# Patient Record
Sex: FEMALE | Race: WHITE | NOT HISPANIC OR LATINO | URBAN - METROPOLITAN AREA
[De-identification: names, ages, dates, MRNs, and addresses within clinical notes are randomized per-mention and may not be internally consistent; named-entity substitution may affect disease eponyms.]

---

## 2018-01-16 ENCOUNTER — EMERGENCY (EMERGENCY)
Facility: HOSPITAL | Age: 53
LOS: 1 days | Discharge: HOME | End: 2018-01-16
Attending: EMERGENCY MEDICINE | Admitting: EMERGENCY MEDICINE
Payer: COMMERCIAL

## 2018-01-16 VITALS
RESPIRATION RATE: 16 BRPM | DIASTOLIC BLOOD PRESSURE: 72 MMHG | HEART RATE: 85 BPM | SYSTOLIC BLOOD PRESSURE: 104 MMHG | OXYGEN SATURATION: 100 %

## 2018-01-16 VITALS
RESPIRATION RATE: 20 BRPM | HEART RATE: 123 BPM | SYSTOLIC BLOOD PRESSURE: 108 MMHG | TEMPERATURE: 97 F | DIASTOLIC BLOOD PRESSURE: 73 MMHG | OXYGEN SATURATION: 96 %

## 2018-01-16 DIAGNOSIS — Z98.89 OTHER SPECIFIED POSTPROCEDURAL STATES: Chronic | ICD-10-CM

## 2018-01-16 LAB
ALBUMIN SERPL ELPH-MCNC: 3.9 G/DL — SIGNIFICANT CHANGE UP (ref 3.3–5)
ALP SERPL-CCNC: 70 U/L — SIGNIFICANT CHANGE UP (ref 40–120)
ALT FLD-CCNC: 8 U/L RC — LOW (ref 10–45)
ANION GAP SERPL CALC-SCNC: 9 MMOL/L — SIGNIFICANT CHANGE UP (ref 5–17)
APPEARANCE UR: CLEAR — SIGNIFICANT CHANGE UP
AST SERPL-CCNC: 12 U/L — SIGNIFICANT CHANGE UP (ref 10–40)
BACTERIA # UR AUTO: ABNORMAL /HPF
BASOPHILS # BLD AUTO: 0.1 K/UL — SIGNIFICANT CHANGE UP (ref 0–0.2)
BASOPHILS NFR BLD AUTO: 0.7 % — SIGNIFICANT CHANGE UP (ref 0–2)
BILIRUB SERPL-MCNC: 0.3 MG/DL — SIGNIFICANT CHANGE UP (ref 0.2–1.2)
BILIRUB UR-MCNC: NEGATIVE — SIGNIFICANT CHANGE UP
BUN SERPL-MCNC: 14 MG/DL — SIGNIFICANT CHANGE UP (ref 7–23)
CALCIUM SERPL-MCNC: 9.2 MG/DL — SIGNIFICANT CHANGE UP (ref 8.4–10.5)
CHLORIDE SERPL-SCNC: 103 MMOL/L — SIGNIFICANT CHANGE UP (ref 96–108)
CO2 SERPL-SCNC: 29 MMOL/L — SIGNIFICANT CHANGE UP (ref 22–31)
COLOR SPEC: YELLOW — SIGNIFICANT CHANGE UP
CREAT SERPL-MCNC: 0.85 MG/DL — SIGNIFICANT CHANGE UP (ref 0.5–1.3)
DIFF PNL FLD: ABNORMAL
EOSINOPHIL # BLD AUTO: 0.2 K/UL — SIGNIFICANT CHANGE UP (ref 0–0.5)
EOSINOPHIL NFR BLD AUTO: 2.4 % — SIGNIFICANT CHANGE UP (ref 0–6)
EPI CELLS # UR: SIGNIFICANT CHANGE UP /HPF
GAS PNL BLDV: SIGNIFICANT CHANGE UP
GLUCOSE SERPL-MCNC: 106 MG/DL — HIGH (ref 70–99)
GLUCOSE UR QL: NEGATIVE — SIGNIFICANT CHANGE UP
HCT VFR BLD CALC: 38.2 % — SIGNIFICANT CHANGE UP (ref 34.5–45)
HGB BLD-MCNC: 13.1 G/DL — SIGNIFICANT CHANGE UP (ref 11.5–15.5)
KETONES UR-MCNC: NEGATIVE — SIGNIFICANT CHANGE UP
LEUKOCYTE ESTERASE UR-ACNC: ABNORMAL
LYMPHOCYTES # BLD AUTO: 1.6 K/UL — SIGNIFICANT CHANGE UP (ref 1–3.3)
LYMPHOCYTES # BLD AUTO: 22.2 % — SIGNIFICANT CHANGE UP (ref 13–44)
MCHC RBC-ENTMCNC: 33.3 PG — SIGNIFICANT CHANGE UP (ref 27–34)
MCHC RBC-ENTMCNC: 34.3 GM/DL — SIGNIFICANT CHANGE UP (ref 32–36)
MCV RBC AUTO: 97.1 FL — SIGNIFICANT CHANGE UP (ref 80–100)
MONOCYTES # BLD AUTO: 0.4 K/UL — SIGNIFICANT CHANGE UP (ref 0–0.9)
MONOCYTES NFR BLD AUTO: 5.4 % — SIGNIFICANT CHANGE UP (ref 2–14)
NEUTROPHILS # BLD AUTO: 5 K/UL — SIGNIFICANT CHANGE UP (ref 1.8–7.4)
NEUTROPHILS NFR BLD AUTO: 69.4 % — SIGNIFICANT CHANGE UP (ref 43–77)
NITRITE UR-MCNC: NEGATIVE — SIGNIFICANT CHANGE UP
PH UR: 6.5 — SIGNIFICANT CHANGE UP (ref 5–8)
PLATELET # BLD AUTO: 318 K/UL — SIGNIFICANT CHANGE UP (ref 150–400)
POTASSIUM SERPL-MCNC: 4 MMOL/L — SIGNIFICANT CHANGE UP (ref 3.5–5.3)
POTASSIUM SERPL-SCNC: 4 MMOL/L — SIGNIFICANT CHANGE UP (ref 3.5–5.3)
PROT SERPL-MCNC: 6.8 G/DL — SIGNIFICANT CHANGE UP (ref 6–8.3)
PROT UR-MCNC: NEGATIVE — SIGNIFICANT CHANGE UP
RBC # BLD: 3.93 M/UL — SIGNIFICANT CHANGE UP (ref 3.8–5.2)
RBC # FLD: 11.6 % — SIGNIFICANT CHANGE UP (ref 10.3–14.5)
RBC CASTS # UR COMP ASSIST: ABNORMAL /HPF (ref 0–2)
SODIUM SERPL-SCNC: 141 MMOL/L — SIGNIFICANT CHANGE UP (ref 135–145)
SP GR SPEC: 1.01 — SIGNIFICANT CHANGE UP (ref 1.01–1.02)
UROBILINOGEN FLD QL: NEGATIVE — SIGNIFICANT CHANGE UP
WBC # BLD: 7.2 K/UL — SIGNIFICANT CHANGE UP (ref 3.8–10.5)
WBC # FLD AUTO: 7.2 K/UL — SIGNIFICANT CHANGE UP (ref 3.8–10.5)
WBC UR QL: SIGNIFICANT CHANGE UP /HPF (ref 0–5)

## 2018-01-16 PROCEDURE — 85027 COMPLETE CBC AUTOMATED: CPT

## 2018-01-16 PROCEDURE — 74176 CT ABD & PELVIS W/O CONTRAST: CPT | Mod: 26

## 2018-01-16 PROCEDURE — 81001 URINALYSIS AUTO W/SCOPE: CPT

## 2018-01-16 PROCEDURE — 99284 EMERGENCY DEPT VISIT MOD MDM: CPT | Mod: 25

## 2018-01-16 PROCEDURE — 82435 ASSAY OF BLOOD CHLORIDE: CPT

## 2018-01-16 PROCEDURE — 82330 ASSAY OF CALCIUM: CPT

## 2018-01-16 PROCEDURE — 80053 COMPREHEN METABOLIC PANEL: CPT

## 2018-01-16 PROCEDURE — 82803 BLOOD GASES ANY COMBINATION: CPT

## 2018-01-16 PROCEDURE — 84295 ASSAY OF SERUM SODIUM: CPT

## 2018-01-16 PROCEDURE — 96374 THER/PROPH/DIAG INJ IV PUSH: CPT

## 2018-01-16 PROCEDURE — 99285 EMERGENCY DEPT VISIT HI MDM: CPT | Mod: 25

## 2018-01-16 PROCEDURE — 84132 ASSAY OF SERUM POTASSIUM: CPT

## 2018-01-16 PROCEDURE — 87086 URINE CULTURE/COLONY COUNT: CPT

## 2018-01-16 PROCEDURE — 74176 CT ABD & PELVIS W/O CONTRAST: CPT

## 2018-01-16 PROCEDURE — 85014 HEMATOCRIT: CPT

## 2018-01-16 PROCEDURE — 83605 ASSAY OF LACTIC ACID: CPT

## 2018-01-16 PROCEDURE — 82947 ASSAY GLUCOSE BLOOD QUANT: CPT

## 2018-01-16 RX ORDER — KETOROLAC TROMETHAMINE 30 MG/ML
15 SYRINGE (ML) INJECTION ONCE
Qty: 0 | Refills: 0 | Status: DISCONTINUED | OUTPATIENT
Start: 2018-01-16 | End: 2018-01-16

## 2018-01-16 RX ORDER — MORPHINE SULFATE 50 MG/1
4 CAPSULE, EXTENDED RELEASE ORAL ONCE
Qty: 0 | Refills: 0 | Status: DISCONTINUED | OUTPATIENT
Start: 2018-01-16 | End: 2018-01-16

## 2018-01-16 RX ORDER — SODIUM CHLORIDE 9 MG/ML
1000 INJECTION INTRAMUSCULAR; INTRAVENOUS; SUBCUTANEOUS ONCE
Qty: 0 | Refills: 0 | Status: COMPLETED | OUTPATIENT
Start: 2018-01-16 | End: 2018-01-16

## 2018-01-16 RX ADMIN — SODIUM CHLORIDE 1000 MILLILITER(S): 9 INJECTION INTRAMUSCULAR; INTRAVENOUS; SUBCUTANEOUS at 08:45

## 2018-01-16 RX ADMIN — Medication 15 MILLIGRAM(S): at 09:15

## 2018-01-16 RX ADMIN — Medication 15 MILLIGRAM(S): at 08:45

## 2018-01-16 NOTE — ED ADULT NURSE NOTE - OBJECTIVE STATEMENT
53y/o female with history of kidney stone, walked into ED a&ox3 c/o flank pain. Patient reports waking up suddenly at around 0300 with pain to her left side. Described as sharp pain to left lower abdomen radiating to left flank, 10/10. Denies N/V/D, urinary symptoms, fever, chills, dizziness, SOB, CP. Lungs clear b/l. Abd soft, nontender, nondistended. +CVA tenderness on left. Awaiting MD sauer. Safety and comfort maintained.

## 2018-01-16 NOTE — ED PROVIDER NOTE - MEDICAL DECISION MAKING DETAILS
Ap Martinez, PGY2: 52F kidney stone in past p/w left flank/LLQ pain and TTP, left CVAT and dark urine today. Concern for kidney stone vs intraabdominal pathology. CT, labs, pain control, UA and culture. Ap Martinez, PGY2: 52F kidney stone in past p/w left flank/LLQ pain and TTP, left CVAT and dark urine today. Concern for kidney stone vs intraabdominal pathology. CT, labs, pain control, UA and culture.  ATTG: left side back / cva tend and abd tend. concern for stone, ddx includes but not limited to colitis, diverticulitis, less likely obstruction / gyn path, will check labs, check ct a/p, ivf, pain medication, re eval for dispo

## 2018-01-16 NOTE — ED PROVIDER NOTE - SKIN, MLM
Skin normal color for race, warm, dry and intact. Blanching erythematous marks on left abdomen where pt is grasping herself.

## 2018-01-16 NOTE — ED PROVIDER NOTE - ATTENDING CONTRIBUTION TO CARE
53 y/o f with pmhx Endometriosis, hx salpingo/oophorectomy, s/p ventral hernia s/p repair presents for left side flank pain that began this morning at around 4 am and woke her up. no vomiting no nausea. no diarrhea. no abd pain. also noted darker colored urine.  last stone 10 yrs ago.  no urinary complaints,. pain is dull and constant, radiates ant. no associated diarrhea. no chest pain no heart palp. did not take any meds today. denies alcohol / tob / illicit  PMD Dr. Guthrie.   Gen.  no acute distress, mild discomfort.   HEENT: PERRL   Lungs:  b/l BS  CVS: S1S2   Abd;  soft, mild llq tend to palp.   Ext: no edema.  Neuro:aaox3.   MSK: 5/5 x4 muscle strength + cva tend on left side.

## 2018-01-16 NOTE — ED PROVIDER NOTE - OBJECTIVE STATEMENT
53yo female pmh endometriosis, ventral hernia s/p repair p/w left flank/LLQ pain that woke pt from sleep at 4am. "Feels like kidney stone I had over 10 years ago." Sharp, 10/10 intermittent pain with radiation to the back. Dark urine this morning. Multiple abd surgeries in past for endometriosis. LMP years ago (endometrial ablation). Last BM 2d ago, normal for her. Unsure if passing gas. Denies fever, chills, chest pain, dyspnea, palpitations, dizziness, weakness, recent cough, nausea, vomiting, diarrhea, bladder and bowel problems, leg swelling, sick contact, recent long travel.    Significant past medical hx/surgical hx/social hx and review of systems can be found above in the history of present illness.

## 2018-01-16 NOTE — ED ADULT NURSE NOTE - PSH
H/O umbilical hernia repair    History of endometrial ablation  7/2008  History of laparoscopy  x 7 for endometriosis 3/93,6/93,12/97,5/00,3/02,3/04, 8/11

## 2018-01-16 NOTE — ED PROVIDER NOTE - PROGRESS NOTE DETAILS
ATTG: Dr. Betts - patient feels better. pain improved. awaiting results of Urinalysis for final dispo, r/o contaminant infection with stone. Patient informed of ED visit findings, understands plan.  Patient provided with written and further verbal instructions not included in discharge paperwork.  Patient instructed to follow up with their primary care physician in 2-3 days and return for new, worsened, or persistent symptoms. Discussed pending urine culture and follow up. Discussed fluid intake and ibuprofen dosing and urology follow up. Pt states she feels well

## 2018-01-17 LAB
CULTURE RESULTS: NO GROWTH — SIGNIFICANT CHANGE UP
SPECIMEN SOURCE: SIGNIFICANT CHANGE UP

## 2018-08-15 ENCOUNTER — EMERGENCY (EMERGENCY)
Facility: HOSPITAL | Age: 53
LOS: 1 days | Discharge: HOME | End: 2018-08-15
Attending: EMERGENCY MEDICINE
Payer: COMMERCIAL

## 2018-08-15 VITALS
SYSTOLIC BLOOD PRESSURE: 115 MMHG | HEART RATE: 87 BPM | RESPIRATION RATE: 17 BRPM | OXYGEN SATURATION: 100 % | TEMPERATURE: 98 F | DIASTOLIC BLOOD PRESSURE: 86 MMHG

## 2018-08-15 DIAGNOSIS — Z98.89 OTHER SPECIFIED POSTPROCEDURAL STATES: Chronic | ICD-10-CM

## 2018-08-15 PROCEDURE — 99284 EMERGENCY DEPT VISIT MOD MDM: CPT

## 2018-08-15 PROCEDURE — 70450 CT HEAD/BRAIN W/O DYE: CPT

## 2018-08-15 PROCEDURE — 73562 X-RAY EXAM OF KNEE 3: CPT | Mod: 26,50

## 2018-08-15 PROCEDURE — 73562 X-RAY EXAM OF KNEE 3: CPT

## 2018-08-15 PROCEDURE — 73060 X-RAY EXAM OF HUMERUS: CPT | Mod: 26,RT

## 2018-08-15 PROCEDURE — 70450 CT HEAD/BRAIN W/O DYE: CPT | Mod: 26

## 2018-08-15 PROCEDURE — 70486 CT MAXILLOFACIAL W/O DYE: CPT | Mod: 26

## 2018-08-15 PROCEDURE — 72125 CT NECK SPINE W/O DYE: CPT | Mod: 26

## 2018-08-15 PROCEDURE — 72125 CT NECK SPINE W/O DYE: CPT

## 2018-08-15 PROCEDURE — 99285 EMERGENCY DEPT VISIT HI MDM: CPT | Mod: 25

## 2018-08-15 PROCEDURE — 73060 X-RAY EXAM OF HUMERUS: CPT

## 2018-08-15 PROCEDURE — 73030 X-RAY EXAM OF SHOULDER: CPT

## 2018-08-15 PROCEDURE — 90471 IMMUNIZATION ADMIN: CPT

## 2018-08-15 PROCEDURE — 70486 CT MAXILLOFACIAL W/O DYE: CPT

## 2018-08-15 PROCEDURE — 90715 TDAP VACCINE 7 YRS/> IM: CPT

## 2018-08-15 PROCEDURE — 73030 X-RAY EXAM OF SHOULDER: CPT | Mod: 26,50

## 2018-08-15 RX ORDER — ACETAMINOPHEN 500 MG
975 TABLET ORAL ONCE
Qty: 0 | Refills: 0 | Status: COMPLETED | OUTPATIENT
Start: 2018-08-15 | End: 2018-08-15

## 2018-08-15 RX ORDER — TETANUS TOXOID, REDUCED DIPHTHERIA TOXOID AND ACELLULAR PERTUSSIS VACCINE, ADSORBED 5; 2.5; 8; 8; 2.5 [IU]/.5ML; [IU]/.5ML; UG/.5ML; UG/.5ML; UG/.5ML
0.5 SUSPENSION INTRAMUSCULAR ONCE
Qty: 0 | Refills: 0 | Status: COMPLETED | OUTPATIENT
Start: 2018-08-15 | End: 2018-08-15

## 2018-08-15 RX ORDER — IBUPROFEN 200 MG
600 TABLET ORAL ONCE
Qty: 0 | Refills: 0 | Status: COMPLETED | OUTPATIENT
Start: 2018-08-15 | End: 2018-08-15

## 2018-08-15 RX ADMIN — Medication 600 MILLIGRAM(S): at 13:50

## 2018-08-15 RX ADMIN — Medication 975 MILLIGRAM(S): at 09:24

## 2018-08-15 RX ADMIN — TETANUS TOXOID, REDUCED DIPHTHERIA TOXOID AND ACELLULAR PERTUSSIS VACCINE, ADSORBED 0.5 MILLILITER(S): 5; 2.5; 8; 8; 2.5 SUSPENSION INTRAMUSCULAR at 09:25

## 2018-08-15 NOTE — ED PROVIDER NOTE - ATTENDING CONTRIBUTION TO CARE
51 y/o female with the above documented history and HPI who on exam appears well but a bit uncomfortable. VSs noted, head NC/AT s/ preauricular ecchymosis or otorrhea, face c/ R temporal abrasion, slight swelling and ttp and R nasal ridge abrasion s/ periorbital ecchymosis, rhinorrhea, epistaxis, midface instability, jaw malalignment or loose dentition, EOMsI, neck supple c/ non-focal midline c-spine ttp s/ palpable deformity, lungs CTA, chest wall s/ ecchymosis, flail or ttp, cardiac sounds s/ audible m/r/g, abdomen soft, NT/ND s/ ecchymosis, back s/ ecchymosis or midline ttp, extremities c/ reddening to lateral aspect of R shoulder/proximal R humerus c/ ttp and as a result not put through ROM testing c/ otherwise a FROM at the R elbow, wrist and all digits of the R hand as well as all other extremities and joints c/ some discomfort on ROM testing of L shoulder and abrasions to B/L knees c/ ttp approximating B/L patellae s/ palpable bony deformity, distal femur or proximal tib/fib ttp or joint laxity, skin s/ laceration and neurologically completely intact. Appropriate screening studies obtained. Td updated. Analgesia provided. Will follow her studies, reassess and treat/dispo accordingly.

## 2018-08-15 NOTE — ED PROVIDER NOTE - OBJECTIVE STATEMENT
Ted Thompson MD: 52 F who was BIBEMS for witnessed slip and fall, toppling down approximately 8 steps, landing on the R side, including the R shoulder and R head and upper face. Reports pain of the R>L shoulder, bilateral knees. Abrasions over the R face and the bilateral lower legs. Pt reports no syncope, lightheadedness, vomiting, weakness, numbness, prodromal symptoms. No LOC, no instability of the face. NO blood thinners or antiplatelets

## 2018-08-15 NOTE — ED PROVIDER NOTE - PROGRESS NOTE DETAILS
Spoke with Radiology regarding plain films. Have not yet had opportunity to review images. The patient does not want to wait any longer. We will DC as requested. Provided copies of results. Called patient and informed her of XR results.

## 2018-08-15 NOTE — ED ADULT NURSE NOTE - NSIMPLEMENTINTERV_GEN_ALL_ED
Implemented All Fall Risk Interventions:  [place] to call system. Call bell, personal items and telephone within reach. Instruct patient to call for assistance. Room bathroom lighting operational. Non-slip footwear when patient is off stretcher. Physically safe environment: no spills, clutter or unnecessary equipment. Stretcher in lowest position, wheels locked, appropriate side rails in place. Provide visual cue, wrist band, yellow gown, etc. Monitor gait and stability. Monitor for mental status changes and reorient to person, place, and time. Review medications for side effects contributing to fall risk. Reinforce activity limits and safety measures with patient and family.

## 2018-08-15 NOTE — ED PROVIDER NOTE - MEDICAL DECISION MAKING DETAILS
Ted Thompson MD: mechanical fall down approx 8 steps, no prodrome or syncope. Injury predominately to the R side. Due to mechanism will get CT scan of head. TTP to C-spine midline, so will CT C-spine. Diff w/ full ROM of the R shoulder and B/L patella, so will get XR.

## 2018-08-15 NOTE — ED PROVIDER NOTE - PHYSICAL EXAMINATION
Primary Survey: airway intact, breathing with symmetrical bilateral lung sounds, circulation with pulses in all 4 extremities.  Secondary Survey: adult F who is AAOx3, normal speech, cooperative, no TTP or deformity to cranium,  GCS 15 , PERRL, 4 mm in diameter, TM without hemotypanum, no bruising to periorbital or post-auricular area, EOMI without diplopia or discomfort, trachea midline, C-collar in place, no stridor, stable max-face, CTAB, NRRR. No chest wall tenderness, deformity or crepitus. No abdominal tenderness or guarding. No signs of external trauma to chest and abdomen. No tenderness or deformity to cervical/thoracic/lumbar vertebrae, hips. Pelvis stable. Extremities neurovascularly intact with soft compartments. No focal sensory or motor deficits. Skin with punctate laceration to the R eyebrow and R nose, abrasions to the bilateral knees and bilateral shins. + TTP to the R shoulder and B/L patella.

## 2018-08-15 NOTE — ED PROVIDER NOTE - NS ED ROS FT
CONST: no fevers  EYES:  no vision changes   HENT: no hearing changes, no epistaxis  CV: no chest pain, no palpitations     RESP: no shortness of breath, no cough  ABD: no abdominal pain, no vomiting     : no hematuria  MSK: + R > L shoulder pain, BL knee pain     NEURO: + R headache, no focal weakness or loss of sensation     SKIN:  + abrasions.   ~ Ted Thompson MD

## 2018-08-15 NOTE — ED PROVIDER NOTE - CHIEF COMPLAINT
The patient is a 52y Female complaining of The patient is a 52y Female complaining of falling down steps.

## 2020-01-29 ENCOUNTER — EMERGENCY (EMERGENCY)
Facility: HOSPITAL | Age: 55
LOS: 1 days | End: 2020-01-29
Attending: EMERGENCY MEDICINE
Payer: COMMERCIAL

## 2020-01-29 VITALS
TEMPERATURE: 98 F | RESPIRATION RATE: 16 BRPM | OXYGEN SATURATION: 97 % | WEIGHT: 128.97 LBS | HEART RATE: 92 BPM | DIASTOLIC BLOOD PRESSURE: 79 MMHG | HEIGHT: 63 IN | SYSTOLIC BLOOD PRESSURE: 112 MMHG

## 2020-01-29 DIAGNOSIS — Z98.89 OTHER SPECIFIED POSTPROCEDURAL STATES: Chronic | ICD-10-CM

## 2020-01-29 LAB
ALBUMIN SERPL ELPH-MCNC: 4.7 G/DL — SIGNIFICANT CHANGE UP (ref 3.3–5)
ALP SERPL-CCNC: 111 U/L — SIGNIFICANT CHANGE UP (ref 40–120)
ALT FLD-CCNC: 16 U/L — SIGNIFICANT CHANGE UP (ref 10–45)
ANION GAP SERPL CALC-SCNC: 19 MMOL/L — HIGH (ref 5–17)
APPEARANCE UR: CLEAR — SIGNIFICANT CHANGE UP
APTT BLD: 29.5 SEC — SIGNIFICANT CHANGE UP (ref 27.5–36.3)
AST SERPL-CCNC: 20 U/L — SIGNIFICANT CHANGE UP (ref 10–40)
BACTERIA # UR AUTO: ABNORMAL
BASOPHILS # BLD AUTO: 0.05 K/UL — SIGNIFICANT CHANGE UP (ref 0–0.2)
BASOPHILS NFR BLD AUTO: 0.6 % — SIGNIFICANT CHANGE UP (ref 0–2)
BILIRUB SERPL-MCNC: 0.6 MG/DL — SIGNIFICANT CHANGE UP (ref 0.2–1.2)
BILIRUB UR-MCNC: ABNORMAL
BUN SERPL-MCNC: 23 MG/DL — SIGNIFICANT CHANGE UP (ref 7–23)
CALCIUM SERPL-MCNC: 9.9 MG/DL — SIGNIFICANT CHANGE UP (ref 8.4–10.5)
CHLORIDE SERPL-SCNC: 100 MMOL/L — SIGNIFICANT CHANGE UP (ref 96–108)
CO2 SERPL-SCNC: 21 MMOL/L — LOW (ref 22–31)
COLOR SPEC: YELLOW — SIGNIFICANT CHANGE UP
CREAT SERPL-MCNC: 0.85 MG/DL — SIGNIFICANT CHANGE UP (ref 0.5–1.3)
DIFF PNL FLD: ABNORMAL
EOSINOPHIL # BLD AUTO: 0.03 K/UL — SIGNIFICANT CHANGE UP (ref 0–0.5)
EOSINOPHIL NFR BLD AUTO: 0.4 % — SIGNIFICANT CHANGE UP (ref 0–6)
EPI CELLS # UR: 5 — SIGNIFICANT CHANGE UP
GAS PNL BLDV: SIGNIFICANT CHANGE UP
GLUCOSE SERPL-MCNC: 68 MG/DL — LOW (ref 70–99)
GLUCOSE UR QL: NEGATIVE — SIGNIFICANT CHANGE UP
HCT VFR BLD CALC: 44.9 % — SIGNIFICANT CHANGE UP (ref 34.5–45)
HGB BLD-MCNC: 14.1 G/DL — SIGNIFICANT CHANGE UP (ref 11.5–15.5)
HYALINE CASTS # UR AUTO: 0 /LPF — SIGNIFICANT CHANGE UP (ref 0–7)
IMM GRANULOCYTES NFR BLD AUTO: 0.1 % — SIGNIFICANT CHANGE UP (ref 0–1.5)
INR BLD: 1.04 RATIO — SIGNIFICANT CHANGE UP (ref 0.88–1.16)
KETONES UR-MCNC: ABNORMAL
LEUKOCYTE ESTERASE UR-ACNC: ABNORMAL
LIDOCAIN IGE QN: 29 U/L — SIGNIFICANT CHANGE UP (ref 7–60)
LYMPHOCYTES # BLD AUTO: 2.01 K/UL — SIGNIFICANT CHANGE UP (ref 1–3.3)
LYMPHOCYTES # BLD AUTO: 25.6 % — SIGNIFICANT CHANGE UP (ref 13–44)
MCHC RBC-ENTMCNC: 29.9 PG — SIGNIFICANT CHANGE UP (ref 27–34)
MCHC RBC-ENTMCNC: 31.4 GM/DL — LOW (ref 32–36)
MCV RBC AUTO: 95.1 FL — SIGNIFICANT CHANGE UP (ref 80–100)
MONOCYTES # BLD AUTO: 0.49 K/UL — SIGNIFICANT CHANGE UP (ref 0–0.9)
MONOCYTES NFR BLD AUTO: 6.3 % — SIGNIFICANT CHANGE UP (ref 2–14)
NEUTROPHILS # BLD AUTO: 5.25 K/UL — SIGNIFICANT CHANGE UP (ref 1.8–7.4)
NEUTROPHILS NFR BLD AUTO: 67 % — SIGNIFICANT CHANGE UP (ref 43–77)
NITRITE UR-MCNC: NEGATIVE — SIGNIFICANT CHANGE UP
NRBC # BLD: 0 /100 WBCS — SIGNIFICANT CHANGE UP (ref 0–0)
PH UR: 5.5 — SIGNIFICANT CHANGE UP (ref 5–8)
PLATELET # BLD AUTO: 386 K/UL — SIGNIFICANT CHANGE UP (ref 150–400)
POTASSIUM SERPL-MCNC: 3.9 MMOL/L — SIGNIFICANT CHANGE UP (ref 3.5–5.3)
POTASSIUM SERPL-SCNC: 3.9 MMOL/L — SIGNIFICANT CHANGE UP (ref 3.5–5.3)
PROT SERPL-MCNC: 8 G/DL — SIGNIFICANT CHANGE UP (ref 6–8.3)
PROT UR-MCNC: ABNORMAL
PROTHROM AB SERPL-ACNC: 11.9 SEC — SIGNIFICANT CHANGE UP (ref 10–12.9)
RBC # BLD: 4.72 M/UL — SIGNIFICANT CHANGE UP (ref 3.8–5.2)
RBC # FLD: 12.1 % — SIGNIFICANT CHANGE UP (ref 10.3–14.5)
RBC CASTS # UR COMP ASSIST: 6 /HPF — HIGH (ref 0–4)
SODIUM SERPL-SCNC: 140 MMOL/L — SIGNIFICANT CHANGE UP (ref 135–145)
SP GR SPEC: 1.03 — HIGH (ref 1.01–1.02)
UROBILINOGEN FLD QL: NEGATIVE — SIGNIFICANT CHANGE UP
WBC # BLD: 7.84 K/UL — SIGNIFICANT CHANGE UP (ref 3.8–10.5)
WBC # FLD AUTO: 7.84 K/UL — SIGNIFICANT CHANGE UP (ref 3.8–10.5)
WBC UR QL: 15 /HPF — HIGH (ref 0–5)

## 2020-01-29 PROCEDURE — 74177 CT ABD & PELVIS W/CONTRAST: CPT | Mod: 26

## 2020-01-29 PROCEDURE — 99218: CPT

## 2020-01-29 RX ORDER — CEFTRIAXONE 500 MG/1
1000 INJECTION, POWDER, FOR SOLUTION INTRAMUSCULAR; INTRAVENOUS EVERY 24 HOURS
Refills: 0 | Status: DISCONTINUED | OUTPATIENT
Start: 2020-01-29 | End: 2020-02-02

## 2020-01-29 RX ORDER — METOCLOPRAMIDE HCL 10 MG
10 TABLET ORAL EVERY 8 HOURS
Refills: 0 | Status: DISCONTINUED | OUTPATIENT
Start: 2020-01-29 | End: 2020-02-02

## 2020-01-29 RX ORDER — BUPROPION HYDROCHLORIDE 150 MG/1
150 TABLET, EXTENDED RELEASE ORAL ONCE
Refills: 0 | Status: COMPLETED | OUTPATIENT
Start: 2020-01-29 | End: 2020-01-30

## 2020-01-29 RX ORDER — ONDANSETRON 8 MG/1
4 TABLET, FILM COATED ORAL ONCE
Refills: 0 | Status: COMPLETED | OUTPATIENT
Start: 2020-01-29 | End: 2020-01-29

## 2020-01-29 RX ORDER — SODIUM CHLORIDE 9 MG/ML
1000 INJECTION, SOLUTION INTRAVENOUS ONCE
Refills: 0 | Status: COMPLETED | OUTPATIENT
Start: 2020-01-29 | End: 2020-01-29

## 2020-01-29 RX ORDER — OXYCODONE HYDROCHLORIDE 5 MG/1
5 TABLET ORAL EVERY 6 HOURS
Refills: 0 | Status: DISCONTINUED | OUTPATIENT
Start: 2020-01-29 | End: 2020-01-30

## 2020-01-29 RX ORDER — SODIUM CHLORIDE 9 MG/ML
3 INJECTION INTRAMUSCULAR; INTRAVENOUS; SUBCUTANEOUS EVERY 8 HOURS
Refills: 0 | Status: DISCONTINUED | OUTPATIENT
Start: 2020-01-29 | End: 2020-02-02

## 2020-01-29 RX ORDER — KETOROLAC TROMETHAMINE 30 MG/ML
15 SYRINGE (ML) INJECTION EVERY 6 HOURS
Refills: 0 | Status: DISCONTINUED | OUTPATIENT
Start: 2020-01-29 | End: 2020-01-29

## 2020-01-29 RX ORDER — CEFTRIAXONE 500 MG/1
1000 INJECTION, POWDER, FOR SOLUTION INTRAMUSCULAR; INTRAVENOUS ONCE
Refills: 0 | Status: COMPLETED | OUTPATIENT
Start: 2020-01-29 | End: 2020-01-29

## 2020-01-29 RX ORDER — ACETAMINOPHEN 500 MG
975 TABLET ORAL EVERY 6 HOURS
Refills: 0 | Status: DISCONTINUED | OUTPATIENT
Start: 2020-01-29 | End: 2020-02-02

## 2020-01-29 RX ORDER — ONDANSETRON 8 MG/1
4 TABLET, FILM COATED ORAL EVERY 6 HOURS
Refills: 0 | Status: DISCONTINUED | OUTPATIENT
Start: 2020-01-29 | End: 2020-02-02

## 2020-01-29 RX ORDER — SODIUM CHLORIDE 9 MG/ML
1000 INJECTION INTRAMUSCULAR; INTRAVENOUS; SUBCUTANEOUS
Refills: 0 | Status: DISCONTINUED | OUTPATIENT
Start: 2020-01-29 | End: 2020-02-02

## 2020-01-29 RX ADMIN — Medication 10 MILLIGRAM(S): at 19:03

## 2020-01-29 RX ADMIN — ONDANSETRON 4 MILLIGRAM(S): 8 TABLET, FILM COATED ORAL at 16:00

## 2020-01-29 RX ADMIN — SODIUM CHLORIDE 3 MILLILITER(S): 9 INJECTION INTRAMUSCULAR; INTRAVENOUS; SUBCUTANEOUS at 22:00

## 2020-01-29 RX ADMIN — SODIUM CHLORIDE 2000 MILLILITER(S): 9 INJECTION, SOLUTION INTRAVENOUS at 13:19

## 2020-01-29 RX ADMIN — SODIUM CHLORIDE 150 MILLILITER(S): 9 INJECTION INTRAMUSCULAR; INTRAVENOUS; SUBCUTANEOUS at 18:46

## 2020-01-29 RX ADMIN — CEFTRIAXONE 100 MILLIGRAM(S): 500 INJECTION, POWDER, FOR SOLUTION INTRAMUSCULAR; INTRAVENOUS at 16:36

## 2020-01-29 RX ADMIN — ONDANSETRON 4 MILLIGRAM(S): 8 TABLET, FILM COATED ORAL at 13:19

## 2020-01-29 NOTE — ED ADULT NURSE NOTE - OBJECTIVE STATEMENT
54y female with hx of laparoscopic sx, endometriosis c/o abdominal pain. pt is alert and oriented x 4 and speaking coherently. pt states that she went to her pcp today due to persistent nausea and vomiting. pt states that she was sent in for r/o obstruction. pt states she cannot remember the last time she had a BM, and is not passing gas. Pt states she has generalized abd pain, states "it feels sore". pain is worse with palpation. Pt denies cp, sob, diarrhea, fevers. pt c/o intermittent chills. vss. pt in nad. spouse at the bedside. will reassess.

## 2020-01-29 NOTE — ED PROVIDER NOTE - CPE EDP CARDIAC NORM
Injury at work. Pt states he felt piece of steel land in his left eye. Pt feels it is still in there. No redness or tearing to left eye. normal...

## 2020-01-29 NOTE — ED CDU PROVIDER INITIAL DAY NOTE - OBJECTIVE STATEMENT
55 yo female PMhx migraines, nephrolithiasis, endometriosis s/p multiple laparoscopic surgeries presents to the ED c/o nausea and vomiting x 5 days. Sxs first started w/ nausea, next day began having multiple episodes of vomiting any time she would eat or drink. Symptoms have persisted with continued vomiting any time she trials PO. Does not think she has had BM in ~3 days. Unsure if she's passing flatus. Endorsing mild generalized abdominal soreness when she touches, none at rest. Went to PMD today and was sent to ED to r/o obstruction. Denies diarrhea, fever/chills, numbness/tingling, recent travel, recent sick contacts, cp, sob, back pain, urinary urgency, frequency, dysuria, hematuria, hemoptysis.    ED course: CBC, CMP negative, UA +ketones bili leuks blood, CT AP showed nonobstructive renal calculi. Given IVF, zofran x2, and ceftriaxone 1g for pyelo. Patient transferred to the CDU for IVF, anti-emetics, and IV ceftriaxone

## 2020-01-29 NOTE — ED CDU PROVIDER INITIAL DAY NOTE - DETAILS
53 yo female PMhx migraines, nephrolithiasis, endometriosis s/p multiple laparoscopic surgeries presents to the ED c/o nausea and vomiting x 5 days.   *PYELONEPHRITIS   -IV ceftriaxone   -IVF  -antiemetics  -vital signs abd viral butler-nick  -Discussed case with Dr. Brower

## 2020-01-29 NOTE — ED PROVIDER NOTE - PROGRESS NOTE DETAILS
Pt still continues to be nauseous. CT negative fro acute pathology, +small stone still in kidney. UA+ for infection, d/w attending will tx for pyelo. Offered pt CDU for continued IVF, abx and antiemetics prn, pt agreeable. CDU aware and accepted. - James Miller PA-C

## 2020-01-29 NOTE — ED PROVIDER NOTE - ATTENDING CONTRIBUTION TO CARE
54F, pmh migraine headaches, nephrolithiasis, endometriosis, s/p numerous abd surgeries for endometriosis, presents with nasuea, vomiting, abd pain x 5 days. pt with nbnb vomiting, inability to tolerate PO. +Generalized abd apin, mild, waxign and waning, worse with palpation. Pt without bm x a few days. Denies diarrhea, melena, hematochezia. Denies f/c, cough, congestion, cp, sob.    PE: NAD, NCAT, MMM, Trachea midline, Normal conjunctiva, lungs CTAB, S1/S2 RRR, Normal perfusion, 2+ radial pulses bilat, Abdomen Soft, mild diffuse ttp, No rebound/guarding, No LE edema, No deformity of extremities, No rashes,  No focal motor or sensory deficits.     Ddx includes but not limtied to sbo, diverticulitis, gastritis, pancreatitis, colitis. Obtain labs, ct a/p, give ivf, antiemetics and analgesia as needed, re-eval. - Renaldo Walden MD

## 2020-01-29 NOTE — ED PROVIDER NOTE - OBJECTIVE STATEMENT
53 yo female PMhx migraines, nephrolithiasis, endometriosis s/p multiple laparoscopic surgeries presents to the ED c/o nausea and vomiting x 5 days. Sxs first started w/ nausea, next day began having multiple episodes of vomiting any time she would eat or drink. Symptoms have persisted with continued vomiting any time she trials PO. Does not think she has had BM in ~3 days. Unsure if she's passing flatus. Endorsing mild generalized abdominal soreness when she touches, none at rest. Went to PMD today and was sent to ED to r/o obstruction. Denies diarrhea, fever/chills, numbness/tingling, recent travel, recent sick contacts, cp, sob, back pain, urinary urgency, frequency, dysuria, hematuria, hemoptysis.

## 2020-01-29 NOTE — ED CDU PROVIDER INITIAL DAY NOTE - PROGRESS NOTE DETAILS
CDU NOTE HALLIE DAVIS: Pt resting comfortably, feeling well without complaint. no abd/flank pain, no N/V. NAD, VSS. Abd: flat/ND/NT/soft, no CVAT b/l. will continue monitoring.

## 2020-01-29 NOTE — ED ADULT NURSE REASSESSMENT NOTE - NS ED NURSE REASSESS COMMENT FT1
18.45 Received Pt from DARNELL Funk . Pt is observed for pyelonephritis. Pt is oriented to the Unit  Comfort care safety measures initiated  Pt C/O nausea denies  fever chills Cp Sob  Continue to monitor
Received report from Jaida @ [id number], Pt sitting comfortable at present, no acute distress
Pt received from DARNELL Giles. Pt oriented to CDU & plan of care was discussed. Pt denies any pain at the moment. Pt denies any N/V and states after reglan she was able to tolerate crackers and clear liquids. Pt denies any urgency, frequency or burning with urination. Safety & comfort measures maintained. Call bell in reach. Will continue to monitor.

## 2020-01-30 VITALS
OXYGEN SATURATION: 96 % | TEMPERATURE: 98 F | DIASTOLIC BLOOD PRESSURE: 75 MMHG | HEART RATE: 66 BPM | SYSTOLIC BLOOD PRESSURE: 118 MMHG | RESPIRATION RATE: 16 BRPM

## 2020-01-30 LAB
BASOPHILS # BLD AUTO: 0.04 K/UL — SIGNIFICANT CHANGE UP (ref 0–0.2)
BASOPHILS NFR BLD AUTO: 0.8 % — SIGNIFICANT CHANGE UP (ref 0–2)
CULTURE RESULTS: SIGNIFICANT CHANGE UP
EOSINOPHIL # BLD AUTO: 0.08 K/UL — SIGNIFICANT CHANGE UP (ref 0–0.5)
EOSINOPHIL NFR BLD AUTO: 1.5 % — SIGNIFICANT CHANGE UP (ref 0–6)
GAS PNL BLDV: SIGNIFICANT CHANGE UP
HCT VFR BLD CALC: 34 % — LOW (ref 34.5–45)
HGB BLD-MCNC: 11.2 G/DL — LOW (ref 11.5–15.5)
IMM GRANULOCYTES NFR BLD AUTO: 0.2 % — SIGNIFICANT CHANGE UP (ref 0–1.5)
LYMPHOCYTES # BLD AUTO: 1.79 K/UL — SIGNIFICANT CHANGE UP (ref 1–3.3)
LYMPHOCYTES # BLD AUTO: 34 % — SIGNIFICANT CHANGE UP (ref 13–44)
MCHC RBC-ENTMCNC: 30.6 PG — SIGNIFICANT CHANGE UP (ref 27–34)
MCHC RBC-ENTMCNC: 32.9 GM/DL — SIGNIFICANT CHANGE UP (ref 32–36)
MCV RBC AUTO: 92.9 FL — SIGNIFICANT CHANGE UP (ref 80–100)
MONOCYTES # BLD AUTO: 0.44 K/UL — SIGNIFICANT CHANGE UP (ref 0–0.9)
MONOCYTES NFR BLD AUTO: 8.3 % — SIGNIFICANT CHANGE UP (ref 2–14)
NEUTROPHILS # BLD AUTO: 2.91 K/UL — SIGNIFICANT CHANGE UP (ref 1.8–7.4)
NEUTROPHILS NFR BLD AUTO: 55.2 % — SIGNIFICANT CHANGE UP (ref 43–77)
NRBC # BLD: 0 /100 WBCS — SIGNIFICANT CHANGE UP (ref 0–0)
PLATELET # BLD AUTO: 295 K/UL — SIGNIFICANT CHANGE UP (ref 150–400)
RBC # BLD: 3.66 M/UL — LOW (ref 3.8–5.2)
RBC # FLD: 12 % — SIGNIFICANT CHANGE UP (ref 10.3–14.5)
SPECIMEN SOURCE: SIGNIFICANT CHANGE UP
WBC # BLD: 5.27 K/UL — SIGNIFICANT CHANGE UP (ref 3.8–10.5)
WBC # FLD AUTO: 5.27 K/UL — SIGNIFICANT CHANGE UP (ref 3.8–10.5)

## 2020-01-30 PROCEDURE — 84132 ASSAY OF SERUM POTASSIUM: CPT

## 2020-01-30 PROCEDURE — 82565 ASSAY OF CREATININE: CPT

## 2020-01-30 PROCEDURE — 99284 EMERGENCY DEPT VISIT MOD MDM: CPT | Mod: 25

## 2020-01-30 PROCEDURE — 99217: CPT

## 2020-01-30 PROCEDURE — 84295 ASSAY OF SERUM SODIUM: CPT

## 2020-01-30 PROCEDURE — 87086 URINE CULTURE/COLONY COUNT: CPT

## 2020-01-30 PROCEDURE — 85027 COMPLETE CBC AUTOMATED: CPT

## 2020-01-30 PROCEDURE — 82435 ASSAY OF BLOOD CHLORIDE: CPT

## 2020-01-30 PROCEDURE — 85014 HEMATOCRIT: CPT

## 2020-01-30 PROCEDURE — G0378: CPT

## 2020-01-30 PROCEDURE — 81001 URINALYSIS AUTO W/SCOPE: CPT

## 2020-01-30 PROCEDURE — 96375 TX/PRO/DX INJ NEW DRUG ADDON: CPT

## 2020-01-30 PROCEDURE — 74177 CT ABD & PELVIS W/CONTRAST: CPT

## 2020-01-30 PROCEDURE — 83690 ASSAY OF LIPASE: CPT

## 2020-01-30 PROCEDURE — 82947 ASSAY GLUCOSE BLOOD QUANT: CPT

## 2020-01-30 PROCEDURE — 96374 THER/PROPH/DIAG INJ IV PUSH: CPT | Mod: XU

## 2020-01-30 PROCEDURE — 83605 ASSAY OF LACTIC ACID: CPT

## 2020-01-30 PROCEDURE — 85610 PROTHROMBIN TIME: CPT

## 2020-01-30 PROCEDURE — 82330 ASSAY OF CALCIUM: CPT

## 2020-01-30 PROCEDURE — 80053 COMPREHEN METABOLIC PANEL: CPT

## 2020-01-30 PROCEDURE — 85730 THROMBOPLASTIN TIME PARTIAL: CPT

## 2020-01-30 PROCEDURE — 82803 BLOOD GASES ANY COMBINATION: CPT

## 2020-01-30 RX ORDER — CEFPODOXIME PROXETIL 100 MG
1 TABLET ORAL
Qty: 20 | Refills: 0
Start: 2020-01-30 | End: 2020-02-08

## 2020-01-30 RX ADMIN — OXYCODONE HYDROCHLORIDE 5 MILLIGRAM(S): 5 TABLET ORAL at 13:10

## 2020-01-30 RX ADMIN — SODIUM CHLORIDE 3 MILLILITER(S): 9 INJECTION INTRAMUSCULAR; INTRAVENOUS; SUBCUTANEOUS at 06:06

## 2020-01-30 RX ADMIN — SODIUM CHLORIDE 150 MILLILITER(S): 9 INJECTION INTRAMUSCULAR; INTRAVENOUS; SUBCUTANEOUS at 01:38

## 2020-01-30 RX ADMIN — ONDANSETRON 4 MILLIGRAM(S): 8 TABLET, FILM COATED ORAL at 13:10

## 2020-01-30 RX ADMIN — BUPROPION HYDROCHLORIDE 150 MILLIGRAM(S): 150 TABLET, EXTENDED RELEASE ORAL at 13:10

## 2020-01-30 NOTE — ED CDU PROVIDER DISPOSITION NOTE - CLINICAL COURSE
55 yo female PMhx migraines, nephrolithiasis, endometriosis s/p multiple laparoscopic surgeries presents to the ED c/o nausea and vomiting x 5 days. Sxs first started w/ nausea, next day began having multiple episodes of vomiting any time she would eat or drink. Symptoms have persisted with continued vomiting any time she trials PO. Does not think she has had BM in ~3 days. Unsure if she's passing flatus. Endorsing mild generalized abdominal soreness when she touches, none at rest. Went to PMD today and was sent to ED to r/o obstruction. Denies diarrhea, fever/chills, numbness/tingling, recent travel, recent sick contacts, cp, sob, back pain, urinary urgency, frequency, dysuria, hematuria, hemoptysis.  ED course: CBC, CMP negative, UA +ketones bili leuks blood, CT AP showed nonobstructive renal calculi. Given IVF, zofran x2, and ceftriaxone 1g for pyelo. Patient transferred to the CDU for IVF, anti-emetics, and IV ceftriaxone.  In CDU, _________________ 55 yo female PMhx migraines, nephrolithiasis, endometriosis s/p multiple laparoscopic surgeries presents to the ED c/o nausea and vomiting x 5 days. Sxs first started w/ nausea, next day began having multiple episodes of vomiting any time she would eat or drink. Symptoms have persisted with continued vomiting any time she trials PO. Does not think she has had BM in ~3 days. Unsure if she's passing flatus. Endorsing mild generalized abdominal soreness when she touches, none at rest. Went to PMD today and was sent to ED to r/o obstruction. Denies diarrhea, fever/chills, numbness/tingling, recent travel, recent sick contacts, cp, sob, back pain, urinary urgency, frequency, dysuria, hematuria, hemoptysis.  ED course: CBC, CMP negative, UA +ketones bili leuks blood, CT AP showed nonobstructive renal calculi. Given IVF, zofran x2, and ceftriaxone 1g for pyelo. Patient transferred to the CDU for IVF, anti-emetics, and IV ceftriaxone.  In CDU, patient improved. Patient tolerating PO. Vitals stable. Seen and evaluated by lucie Urbano d/c home

## 2020-01-30 NOTE — ED CDU PROVIDER SUBSEQUENT DAY NOTE - PROGRESS NOTE DETAILS
CDU NOTE HALLIE DAVIS: Pt resting comfortably, feeling well without complaint. NAD, VSS. will continue monitoring. CDU NOTE HALLIE Aguilar: VSS NAD. Patient is resting comfortably and is without any complaints. Overall feels improved, will po challenge and d/c home this am CDU NOTE HALLIE Aguilar: VSS NAD. Patient is resting comfortably and is without any complaints. Patient tolerating PO. Overall improved. Vitals stable. Seen and evaluated by Dr. Evans, lucie d/c marisela

## 2020-01-30 NOTE — ED CDU PROVIDER DISPOSITION NOTE - PATIENT PORTAL LINK FT
You can access the FollowMyHealth Patient Portal offered by [facility] by registering at the following website: [email or web address]. By joining [facility]’s FollowMyHealth portal, you will also be able to view your health information using other applications (apps) compatible with our system.

## 2020-01-30 NOTE — ED CDU PROVIDER DISPOSITION NOTE - NSFOLLOWUPINSTRUCTIONS_ED_ALL_ED_FT
1. Take _____________.  Increase fluids. Take Motrin 600mg every 8 hours with food if needed for pain.   2.  Follow up with your PMD within 48-72 hours.  3. Worsening pain, new fever, chills, nausea, vomiting return to ER 1. Take Cefpodoxime 1 tab every 12 hours for 10 days.  Increase fluids. Take Tylenol 1g every 6 hours alternated with Motrin 600mg every 8 hours with food if needed for pain and or fevers.   2. Follow up with your PMD within 48-72 hours.  3. Worsening pain,  fever, chills, nausea, vomiting return to ER

## 2020-01-30 NOTE — ED CDU PROVIDER SUBSEQUENT DAY NOTE - HISTORY
No interval changes since initial CDU provider note. Pt feels well without complaint. no abd/flank pain. no N/V. NAD VSS. will continue IV ceftriaxone and monitoring. - HALLIE Titus

## 2020-01-30 NOTE — ED CDU PROVIDER DISPOSITION NOTE - ATTENDING CONTRIBUTION TO CARE
Patient seen and evaluated.  Clinical pyelonephritis without e/o ureteronephrosis.  Nontoxic, not vomiting, has taken PO shortly prior to my evaluation and has no complaints.  Nontoxic, afebrile, no CVAT, benign abdomen.  Will continue brief obs, transition to oral abx, plan for d/c c close urology f/u.  --BMM

## 2021-06-06 ENCOUNTER — INPATIENT (INPATIENT)
Facility: HOSPITAL | Age: 56
LOS: 0 days | Discharge: HOME | DRG: 661 | End: 2021-06-07
Attending: INTERNAL MEDICINE | Admitting: INTERNAL MEDICINE
Payer: COMMERCIAL

## 2021-06-06 ENCOUNTER — TRANSCRIPTION ENCOUNTER (OUTPATIENT)
Age: 56
End: 2021-06-06

## 2021-06-06 VITALS
RESPIRATION RATE: 18 BRPM | HEIGHT: 63 IN | HEART RATE: 75 BPM | DIASTOLIC BLOOD PRESSURE: 90 MMHG | OXYGEN SATURATION: 98 % | TEMPERATURE: 98 F | WEIGHT: 130.07 LBS | SYSTOLIC BLOOD PRESSURE: 136 MMHG

## 2021-06-06 DIAGNOSIS — Z98.89 OTHER SPECIFIED POSTPROCEDURAL STATES: Chronic | ICD-10-CM

## 2021-06-06 LAB
ALBUMIN SERPL ELPH-MCNC: 4.3 G/DL — SIGNIFICANT CHANGE UP (ref 3.3–5)
ALP SERPL-CCNC: 90 U/L — SIGNIFICANT CHANGE UP (ref 40–120)
ALT FLD-CCNC: 11 U/L — SIGNIFICANT CHANGE UP (ref 10–45)
ANION GAP SERPL CALC-SCNC: 13 MMOL/L — SIGNIFICANT CHANGE UP (ref 5–17)
AST SERPL-CCNC: 13 U/L — SIGNIFICANT CHANGE UP (ref 10–40)
BASE EXCESS BLDV CALC-SCNC: 4.3 MMOL/L — HIGH (ref -2–2)
BASOPHILS # BLD AUTO: 0.06 K/UL — SIGNIFICANT CHANGE UP (ref 0–0.2)
BASOPHILS NFR BLD AUTO: 0.8 % — SIGNIFICANT CHANGE UP (ref 0–2)
BILIRUB SERPL-MCNC: 0.2 MG/DL — SIGNIFICANT CHANGE UP (ref 0.2–1.2)
BUN SERPL-MCNC: 13 MG/DL — SIGNIFICANT CHANGE UP (ref 7–23)
CA-I SERPL-SCNC: 1.19 MMOL/L — SIGNIFICANT CHANGE UP (ref 1.12–1.3)
CALCIUM SERPL-MCNC: 9.4 MG/DL — SIGNIFICANT CHANGE UP (ref 8.4–10.5)
CHLORIDE BLDV-SCNC: 106 MMOL/L — SIGNIFICANT CHANGE UP (ref 96–108)
CHLORIDE SERPL-SCNC: 103 MMOL/L — SIGNIFICANT CHANGE UP (ref 96–108)
CO2 BLDV-SCNC: 32 MMOL/L — HIGH (ref 22–30)
CO2 SERPL-SCNC: 25 MMOL/L — SIGNIFICANT CHANGE UP (ref 22–31)
CREAT SERPL-MCNC: 0.96 MG/DL — SIGNIFICANT CHANGE UP (ref 0.5–1.3)
EOSINOPHIL # BLD AUTO: 0.17 K/UL — SIGNIFICANT CHANGE UP (ref 0–0.5)
EOSINOPHIL NFR BLD AUTO: 2.3 % — SIGNIFICANT CHANGE UP (ref 0–6)
GAS PNL BLDV: 140 MMOL/L — SIGNIFICANT CHANGE UP (ref 135–145)
GAS PNL BLDV: SIGNIFICANT CHANGE UP
GAS PNL BLDV: SIGNIFICANT CHANGE UP
GLUCOSE BLDV-MCNC: 100 MG/DL — HIGH (ref 70–99)
GLUCOSE SERPL-MCNC: 100 MG/DL — HIGH (ref 70–99)
HCG SERPL-ACNC: 2.6 MIU/ML — SIGNIFICANT CHANGE UP
HCO3 BLDV-SCNC: 30 MMOL/L — HIGH (ref 21–29)
HCT VFR BLD CALC: 38.4 % — SIGNIFICANT CHANGE UP (ref 34.5–45)
HCT VFR BLDA CALC: 41 % — SIGNIFICANT CHANGE UP (ref 39–50)
HGB BLD CALC-MCNC: 13.2 G/DL — SIGNIFICANT CHANGE UP (ref 11.5–15.5)
HGB BLD-MCNC: 12.6 G/DL — SIGNIFICANT CHANGE UP (ref 11.5–15.5)
IMM GRANULOCYTES NFR BLD AUTO: 0.1 % — SIGNIFICANT CHANGE UP (ref 0–1.5)
LACTATE BLDV-MCNC: 1.1 MMOL/L — SIGNIFICANT CHANGE UP (ref 0.7–2)
LIDOCAIN IGE QN: 39 U/L — SIGNIFICANT CHANGE UP (ref 7–60)
LYMPHOCYTES # BLD AUTO: 2.33 K/UL — SIGNIFICANT CHANGE UP (ref 1–3.3)
LYMPHOCYTES # BLD AUTO: 30.9 % — SIGNIFICANT CHANGE UP (ref 13–44)
MCHC RBC-ENTMCNC: 30.5 PG — SIGNIFICANT CHANGE UP (ref 27–34)
MCHC RBC-ENTMCNC: 32.8 GM/DL — SIGNIFICANT CHANGE UP (ref 32–36)
MCV RBC AUTO: 93 FL — SIGNIFICANT CHANGE UP (ref 80–100)
MONOCYTES # BLD AUTO: 0.51 K/UL — SIGNIFICANT CHANGE UP (ref 0–0.9)
MONOCYTES NFR BLD AUTO: 6.8 % — SIGNIFICANT CHANGE UP (ref 2–14)
NEUTROPHILS # BLD AUTO: 4.45 K/UL — SIGNIFICANT CHANGE UP (ref 1.8–7.4)
NEUTROPHILS NFR BLD AUTO: 59.1 % — SIGNIFICANT CHANGE UP (ref 43–77)
NRBC # BLD: 0 /100 WBCS — SIGNIFICANT CHANGE UP (ref 0–0)
PCO2 BLDV: 54 MMHG — HIGH (ref 35–50)
PH BLDV: 7.37 — SIGNIFICANT CHANGE UP (ref 7.35–7.45)
PLATELET # BLD AUTO: 348 K/UL — SIGNIFICANT CHANGE UP (ref 150–400)
PO2 BLDV: 28 MMHG — SIGNIFICANT CHANGE UP (ref 25–45)
POTASSIUM BLDV-SCNC: 3.6 MMOL/L — SIGNIFICANT CHANGE UP (ref 3.5–5.3)
POTASSIUM SERPL-MCNC: 3.8 MMOL/L — SIGNIFICANT CHANGE UP (ref 3.5–5.3)
POTASSIUM SERPL-SCNC: 3.8 MMOL/L — SIGNIFICANT CHANGE UP (ref 3.5–5.3)
PROT SERPL-MCNC: 7 G/DL — SIGNIFICANT CHANGE UP (ref 6–8.3)
RBC # BLD: 4.13 M/UL — SIGNIFICANT CHANGE UP (ref 3.8–5.2)
RBC # FLD: 12.5 % — SIGNIFICANT CHANGE UP (ref 10.3–14.5)
SAO2 % BLDV: 48 % — LOW (ref 67–88)
SODIUM SERPL-SCNC: 141 MMOL/L — SIGNIFICANT CHANGE UP (ref 135–145)
WBC # BLD: 7.53 K/UL — SIGNIFICANT CHANGE UP (ref 3.8–10.5)
WBC # FLD AUTO: 7.53 K/UL — SIGNIFICANT CHANGE UP (ref 3.8–10.5)

## 2021-06-06 PROCEDURE — 93010 ELECTROCARDIOGRAM REPORT: CPT | Mod: 59

## 2021-06-06 PROCEDURE — 74176 CT ABD & PELVIS W/O CONTRAST: CPT | Mod: 26,MA

## 2021-06-06 PROCEDURE — 99285 EMERGENCY DEPT VISIT HI MDM: CPT

## 2021-06-06 RX ORDER — ACETAMINOPHEN 500 MG
1000 TABLET ORAL ONCE
Refills: 0 | Status: COMPLETED | OUTPATIENT
Start: 2021-06-06 | End: 2021-06-06

## 2021-06-06 RX ORDER — SODIUM CHLORIDE 9 MG/ML
1000 INJECTION, SOLUTION INTRAVENOUS ONCE
Refills: 0 | Status: COMPLETED | OUTPATIENT
Start: 2021-06-06 | End: 2021-06-06

## 2021-06-06 RX ORDER — KETOROLAC TROMETHAMINE 30 MG/ML
15 SYRINGE (ML) INJECTION ONCE
Refills: 0 | Status: DISCONTINUED | OUTPATIENT
Start: 2021-06-06 | End: 2021-06-06

## 2021-06-06 RX ORDER — ONDANSETRON 8 MG/1
4 TABLET, FILM COATED ORAL ONCE
Refills: 0 | Status: COMPLETED | OUTPATIENT
Start: 2021-06-06 | End: 2021-06-06

## 2021-06-06 RX ORDER — MORPHINE SULFATE 50 MG/1
4 CAPSULE, EXTENDED RELEASE ORAL ONCE
Refills: 0 | Status: DISCONTINUED | OUTPATIENT
Start: 2021-06-06 | End: 2021-06-06

## 2021-06-06 RX ADMIN — ONDANSETRON 4 MILLIGRAM(S): 8 TABLET, FILM COATED ORAL at 22:45

## 2021-06-06 RX ADMIN — SODIUM CHLORIDE 1000 MILLILITER(S): 9 INJECTION, SOLUTION INTRAVENOUS at 22:45

## 2021-06-06 RX ADMIN — Medication 15 MILLIGRAM(S): at 23:39

## 2021-06-06 RX ADMIN — MORPHINE SULFATE 4 MILLIGRAM(S): 50 CAPSULE, EXTENDED RELEASE ORAL at 22:44

## 2021-06-06 RX ADMIN — Medication 400 MILLIGRAM(S): at 22:45

## 2021-06-06 NOTE — ED PROVIDER NOTE - PROGRESS NOTE DETAILS
Matty Freitas, PGY-1- Pt reexamined, still with pain and nausea. Does not feel comfortable going home. Urology paged, will camacho pt in ED Matty Freitas, PGY-1- Pt evaluated by urology in ED, awaiting recs. Will give pt additional fluids and strainer to attempt to catch the stone Matty Freitas, PGY-1- Discussed liver lesion found on CT with patient and . Expressed understanding that following acute episode of renal stone she is to follow up with her PCP for MRI to better characterize and monitor the lesion.

## 2021-06-06 NOTE — ED PROVIDER NOTE - ATTENDING CONTRIBUTION TO CARE
55F presenting with left flank pain x1 day, nausea, lower abdominal pressure, likely renal colic, will treat pain, check labs + imaging, follow up studies, reassess, dispoJyotsna Jaimes

## 2021-06-06 NOTE — ED ADULT NURSE NOTE - OBJECTIVE STATEMENT
Pt is a 56 y/o female c/o left sided flank pain radiating around abdomen. States she has a known left sided kidney stone which has never caused her problems, states she has had many right sided kidney stones in past has required lithotripsy. States she has had ovaries and fallopian tubes removed r/t endometriosis. C/o nausea. States she has not had a bowel movement in past few days, has not passed gas today. Denies CP, SOB, V/D, numbness, tingling, cough, fever, chills, dizziness, weakness, headache. A&Ox3. Breathing unlabored and spontaneous. Full ROM and strength of extremities. Skin dry and intact. Safety and comfort measures provided, call bell provided to pt and bed in lowest position.

## 2021-06-06 NOTE — ED PROVIDER NOTE - NS ED ROS FT
General: no fever, no chills  Eyes: no vision changes, no eye pain  Cardiovascular: no chest pain, no edema  Respiratory: no cough, no shortness of breath  Gastrointestinal: +nausea, abd pressure, no vomiting, no diarrhea  Genitourinary: no dysuria, no hematuria, +L flank pain   Musculoskeletal: no muscle pain, no joint pain  Skin: no rash, no lesions  Neuro: no numbness, no tingling  Psych: no depression, no anxiety

## 2021-06-06 NOTE — ED PROVIDER NOTE - OBJECTIVE STATEMENT
55 year old female with a pmhx of endometriosis, migraines, nephrolithiasis, s/p lithotripsy, presents to ED for evaluation of L Flank pain that began yesterday. Pt reports it to feel similar to previous renal stones. Worsening in intensity despite taking Percocet and Tamsulosin. Pt last took Percocet at 1500 this afternoon. Notes associated nausea and lower abd pressure. States she has not had a BM in a few days and has not passed gas today. Pt denies any fever, chills, cp, sob, vomiting, diarrhea, dysuria, or hematuria. Hx of multiple laparoscopic surgeries. 55 year old female with a pmhx of endometriosis, migraines, nephrolithiasis, s/p lithotripsy, presents to ED for evaluation of L Flank pain that began yesterday. Pt reports it to feel similar to previous renal stones. Worsening in intensity despite taking Percocet and Tamsulosin. Pt last took Percocet at 1500 this afternoon. Notes associated nausea and lower abd pressure. States she has not had a BM in a few days and has not passed gas today. Pt denies any fever, chills, cp, sob, vomiting, diarrhea, dysuria, or hematuria. Hx of multiple laparoscopic surgeries and s/p b/l salpingo-oopherectomy.

## 2021-06-06 NOTE — ED PROVIDER NOTE - CLINICAL SUMMARY MEDICAL DECISION MAKING FREE TEXT BOX
55 year old female with hx of renal stones, presenting w left flank pain x 1day, now with nausea and lower abd pressure. Will obtain labs, ua, ct abd/pelv non con. Likely renal stone, possible SBO, but less likely. Pain control and reeval

## 2021-06-06 NOTE — ED PROVIDER NOTE - PHYSICAL EXAMINATION
General: appears uncomfortable, AOx3  Skin: normal color for race, no rash  Head: normocephalic, atraumatic  Eyes: clear conjunctiva, EOMI  ENMT: airway patent, no nasal discharge  Cardiovascular: normal rate, normal rhythm, S1/S2  Pulmonary: clear to auscultation bilaterally, no rales, rhonchi, or wheeze  Abdomen: soft, lower abd tenderness, L flank tenderness   Musculoskeletal: moving extremities well, no deformity  Psych: normal mood, normal affect

## 2021-06-06 NOTE — ED ADULT TRIAGE NOTE - HEIGHT IN CM
Render Note In Bullet Format When Appropriate: No Detail Level: Zone Consent: The patient's consent was obtained including but not limited to risks of crusting, scabbing, blistering, scarring, darker or lighter pigmentary change, recurrence, incomplete removal and infection. Number Of Freeze-Thaw Cycles: 2 freeze-thaw cycles Post-Care Instructions: I reviewed with the patient in detail post-care instructions. Patient is to wear sunprotection, and avoid picking at any of the treated lesions. Pt may apply Vaseline to crusted or scabbing areas. Duration Of Freeze Thaw-Cycle (Seconds): 10 Duration Of Freeze Thaw-Cycle (Seconds): 15-20 Include Z78.9 (Other Specified Conditions Influencing Health Status) As An Associated Diagnosis?: Yes Pared With?: 11 blade Medical Necessity Clause: This procedure was medically necessary because the lesions that were treated were: Medical Necessity Information: It is in your best interest to select a reason for this procedure from the list below. All of these items fulfill various CMS LCD requirements except the new and changing color options. Detail Level: Simple 160.02

## 2021-06-07 ENCOUNTER — RESULT REVIEW (OUTPATIENT)
Age: 56
End: 2021-06-07

## 2021-06-07 ENCOUNTER — TRANSCRIPTION ENCOUNTER (OUTPATIENT)
Age: 56
End: 2021-06-07

## 2021-06-07 VITALS
DIASTOLIC BLOOD PRESSURE: 66 MMHG | TEMPERATURE: 98 F | OXYGEN SATURATION: 98 % | HEART RATE: 72 BPM | SYSTOLIC BLOOD PRESSURE: 128 MMHG | RESPIRATION RATE: 16 BRPM

## 2021-06-07 DIAGNOSIS — G43.909 MIGRAINE, UNSPECIFIED, NOT INTRACTABLE, WITHOUT STATUS MIGRAINOSUS: ICD-10-CM

## 2021-06-07 DIAGNOSIS — N20.1 CALCULUS OF URETER: ICD-10-CM

## 2021-06-07 DIAGNOSIS — Z29.9 ENCOUNTER FOR PROPHYLACTIC MEASURES, UNSPECIFIED: ICD-10-CM

## 2021-06-07 LAB
APPEARANCE UR: CLEAR — SIGNIFICANT CHANGE UP
BACTERIA # UR AUTO: NEGATIVE — SIGNIFICANT CHANGE UP
BILIRUB UR-MCNC: NEGATIVE — SIGNIFICANT CHANGE UP
COLOR SPEC: SIGNIFICANT CHANGE UP
DIFF PNL FLD: ABNORMAL
EPI CELLS # UR: 2 /HPF — SIGNIFICANT CHANGE UP
GLUCOSE UR QL: NEGATIVE — SIGNIFICANT CHANGE UP
HYALINE CASTS # UR AUTO: 1 /LPF — SIGNIFICANT CHANGE UP (ref 0–2)
KETONES UR-MCNC: SIGNIFICANT CHANGE UP
LEUKOCYTE ESTERASE UR-ACNC: NEGATIVE — SIGNIFICANT CHANGE UP
NITRITE UR-MCNC: NEGATIVE — SIGNIFICANT CHANGE UP
PH UR: 6.5 — SIGNIFICANT CHANGE UP (ref 5–8)
PROT UR-MCNC: ABNORMAL
RBC CASTS # UR COMP ASSIST: 369 /HPF — HIGH (ref 0–4)
SARS-COV-2 RNA SPEC QL NAA+PROBE: SIGNIFICANT CHANGE UP
SP GR SPEC: 1.02 — SIGNIFICANT CHANGE UP (ref 1.01–1.02)
UROBILINOGEN FLD QL: NEGATIVE — SIGNIFICANT CHANGE UP
WBC UR QL: 6 /HPF — HIGH (ref 0–5)

## 2021-06-07 PROCEDURE — 99222 1ST HOSP IP/OBS MODERATE 55: CPT

## 2021-06-07 PROCEDURE — 88300 SURGICAL PATH GROSS: CPT | Mod: 26

## 2021-06-07 RX ORDER — MORPHINE SULFATE 50 MG/1
2 CAPSULE, EXTENDED RELEASE ORAL EVERY 4 HOURS
Refills: 0 | Status: DISCONTINUED | OUTPATIENT
Start: 2021-06-07 | End: 2021-06-07

## 2021-06-07 RX ORDER — HYDROMORPHONE HYDROCHLORIDE 2 MG/ML
0.5 INJECTION INTRAMUSCULAR; INTRAVENOUS; SUBCUTANEOUS
Refills: 0 | Status: DISCONTINUED | OUTPATIENT
Start: 2021-06-07 | End: 2021-06-07

## 2021-06-07 RX ORDER — ONDANSETRON 8 MG/1
4 TABLET, FILM COATED ORAL EVERY 8 HOURS
Refills: 0 | Status: DISCONTINUED | OUTPATIENT
Start: 2021-06-07 | End: 2021-06-07

## 2021-06-07 RX ORDER — METOCLOPRAMIDE HCL 10 MG
10 TABLET ORAL ONCE
Refills: 0 | Status: COMPLETED | OUTPATIENT
Start: 2021-06-07 | End: 2021-06-07

## 2021-06-07 RX ORDER — ONDANSETRON 8 MG/1
4 TABLET, FILM COATED ORAL ONCE
Refills: 0 | Status: COMPLETED | OUTPATIENT
Start: 2021-06-07 | End: 2021-06-07

## 2021-06-07 RX ORDER — ENOXAPARIN SODIUM 100 MG/ML
40 INJECTION SUBCUTANEOUS DAILY
Refills: 0 | Status: DISCONTINUED | OUTPATIENT
Start: 2021-06-07 | End: 2021-06-07

## 2021-06-07 RX ORDER — CIPROFLOXACIN LACTATE 400MG/40ML
1 VIAL (ML) INTRAVENOUS
Qty: 10 | Refills: 0
Start: 2021-06-07 | End: 2021-06-11

## 2021-06-07 RX ORDER — SODIUM CHLORIDE 9 MG/ML
1000 INJECTION INTRAMUSCULAR; INTRAVENOUS; SUBCUTANEOUS
Refills: 0 | Status: DISCONTINUED | OUTPATIENT
Start: 2021-06-07 | End: 2021-06-07

## 2021-06-07 RX ORDER — BUPROPION HYDROCHLORIDE 150 MG/1
150 TABLET, EXTENDED RELEASE ORAL DAILY
Refills: 0 | Status: DISCONTINUED | OUTPATIENT
Start: 2021-06-07 | End: 2021-06-07

## 2021-06-07 RX ORDER — CEFTRIAXONE 500 MG/1
1000 INJECTION, POWDER, FOR SOLUTION INTRAMUSCULAR; INTRAVENOUS EVERY 24 HOURS
Refills: 0 | Status: DISCONTINUED | OUTPATIENT
Start: 2021-06-07 | End: 2021-06-07

## 2021-06-07 RX ORDER — ONDANSETRON 8 MG/1
4 TABLET, FILM COATED ORAL ONCE
Refills: 0 | Status: DISCONTINUED | OUTPATIENT
Start: 2021-06-07 | End: 2021-06-07

## 2021-06-07 RX ORDER — SUMATRIPTAN SUCCINATE 4 MG/.5ML
100 INJECTION, SOLUTION SUBCUTANEOUS
Refills: 0 | Status: DISCONTINUED | OUTPATIENT
Start: 2021-06-07 | End: 2021-06-07

## 2021-06-07 RX ORDER — SODIUM CHLORIDE 9 MG/ML
1000 INJECTION, SOLUTION INTRAVENOUS ONCE
Refills: 0 | Status: COMPLETED | OUTPATIENT
Start: 2021-06-07 | End: 2021-06-07

## 2021-06-07 RX ORDER — TAMSULOSIN HYDROCHLORIDE 0.4 MG/1
1 CAPSULE ORAL
Qty: 30 | Refills: 0
Start: 2021-06-07 | End: 2021-07-06

## 2021-06-07 RX ORDER — SODIUM CHLORIDE 9 MG/ML
1000 INJECTION, SOLUTION INTRAVENOUS
Refills: 0 | Status: DISCONTINUED | OUTPATIENT
Start: 2021-06-07 | End: 2021-06-07

## 2021-06-07 RX ADMIN — SODIUM CHLORIDE 1000 MILLILITER(S): 9 INJECTION, SOLUTION INTRAVENOUS at 01:56

## 2021-06-07 RX ADMIN — MORPHINE SULFATE 4 MILLIGRAM(S): 50 CAPSULE, EXTENDED RELEASE ORAL at 02:01

## 2021-06-07 RX ADMIN — ONDANSETRON 4 MILLIGRAM(S): 8 TABLET, FILM COATED ORAL at 00:06

## 2021-06-07 RX ADMIN — SODIUM CHLORIDE 75 MILLILITER(S): 9 INJECTION INTRAMUSCULAR; INTRAVENOUS; SUBCUTANEOUS at 12:24

## 2021-06-07 RX ADMIN — BUPROPION HYDROCHLORIDE 150 MILLIGRAM(S): 150 TABLET, EXTENDED RELEASE ORAL at 12:22

## 2021-06-07 RX ADMIN — Medication 1000 MILLIGRAM(S): at 02:01

## 2021-06-07 RX ADMIN — Medication 15 MILLIGRAM(S): at 02:01

## 2021-06-07 RX ADMIN — SUMATRIPTAN SUCCINATE 100 MILLIGRAM(S): 4 INJECTION, SOLUTION SUBCUTANEOUS at 12:22

## 2021-06-07 RX ADMIN — CEFTRIAXONE 100 MILLIGRAM(S): 500 INJECTION, POWDER, FOR SOLUTION INTRAMUSCULAR; INTRAVENOUS at 12:24

## 2021-06-07 RX ADMIN — SODIUM CHLORIDE 1000 MILLILITER(S): 9 INJECTION, SOLUTION INTRAVENOUS at 02:01

## 2021-06-07 RX ADMIN — Medication 10 MILLIGRAM(S): at 01:22

## 2021-06-07 NOTE — ASU DISCHARGE PLAN (ADULT/PEDIATRIC) - CARE PROVIDER_API CALL
Gadiel Mcgovern)  Urology  [address], [address]  [place] [address]  Phone: [phone]  Fax: [phone]  Follow Up Time: 1 week

## 2021-06-07 NOTE — CHART NOTE - NSCHARTNOTEFT_GEN_A_CORE
55 year old female with a pmhx of endometriosis, migraines, nephrolithiasis, s/p lithotripsy with Dr. Mcgovern in 2019, presents to ED for evaluation of L Flank pain radiating to LLQ that began yesterday. Pt reports it to feel similar to previous renal stones. Worsening in intensity despite taking Percocet and Tamsulosin. Pt last took Percocet at 1500 this afternoon. Notes associated nausea and several episodes of vomiting (last episode approximately 01:30). Pt otherwise denies fever, chills, cp, sob, dysuria, or hematuria.  Patient found to have 3mm L UVJ and 3mm L intrarenal stone with mild to moderate hydro.  Labs WBC 7.5, H/H 12.6/38.4, Cr 0.96. UA remarkable for blood only.  Patient has received IV tylenol, toradol, 4mg of morphine and zofran x 2 and reglan with persistent intractable pain and nausea/vomiting.     ICU Vital Signs Last 24 Hrs  T(C): 36.4 (07 Jun 2021 01:25), Max: 36.7 (06 Jun 2021 21:18)  T(F): 97.5 (07 Jun 2021 01:25), Max: 98.1 (06 Jun 2021 21:18)  HR: 68 (07 Jun 2021 01:25) (68 - 75)  BP: 105/67 (07 Jun 2021 01:25) (105/67 - 136/90)  BP(mean): 80 (07 Jun 2021 01:25) (80 - 80)  RR: 16 (07 Jun 2021 01:25) (16 - 22)  SpO2: 98% (07 Jun 2021 01:25) (98% - 98%)    General: NAD, uncomfortable appearing  Neuro: A+Ox3, no focal deficits  Resp: No respiratory distress or accessory muscle use. no supplemental O2  Abd: Soft, +ttp LLQ, ND, no rebound/guarding  Back: moderate +L CVAT  : voiding  Vascular: All 4 extremities warm and appear well perfused  Skin: Intact, no breakdown  Musculoskeletal: All 4 extremities moving spontaneously, no limitations.    A/P: 56 y/o F PMH of nephrolithiasis s/p lithotripsy in 2019 with Dr. Mcgovern presents for worsening L flank pain radiating to the LLQ, found to have 3mm L UVJ and 3mm L intrarenal stone with mild to moderate hydro with intractable pain and nausea/vomiting.  -formal consult to follow from Dr. Mcgovern's group from [facility]  -admit to [facility] hospitalist  -please make NPO    Discussed with Dr. Johnson, who will follow along with pt while admitted. For routine questions, office #[phone].

## 2021-06-07 NOTE — PROGRESS NOTE ADULT - ASSESSMENT
A/P: 55y Female s/p L URS/LL, L stent placement  - 5 day of gram negative ABX coverage  - follow up in one week for removal of stent with Dr. Mcgovern outpatient  - DVT prophylaxis/OOB  - Incentive spirometry  - Strict I&O's  - Analgesia and antiemetics as needed  - Diet

## 2021-06-07 NOTE — CHART NOTE - NSCHARTNOTEFT_GEN_A_CORE
Case discussed with Dr. Aviles.  Clear for discharge from medical standpoint.  Dr. Aviles gave permission to place DC order.   Will DC

## 2021-06-07 NOTE — H&P ADULT - NSICDXPASTSURGICALHX_GEN_ALL_CORE_FT
PAST SURGICAL HISTORY:  H/O umbilical hernia repair     History of endometrial ablation 7/2008    History of laparoscopy x 7 for endometriosis 3/93,6/93,12/97,5/00,3/02,3/04, 8/11

## 2021-06-07 NOTE — DISCHARGE NOTE NURSING/CASE MANAGEMENT/SOCIAL WORK - PATIENT PORTAL LINK FT
You can access the FollowMyHealth Patient Portal offered by [facility] by registering at the following website: [email or web address]. By joining [place]’s FollowMyHealth portal, you will also be able to view your health information using other applications (apps) compatible with our system.

## 2021-06-07 NOTE — H&P ADULT - ASSESSMENT
56 y/o F with history of nephrolithiasis and lithotripsy in the past presenting with L flank pain radiating to the pelvis found to have L hydroureteronephrosis with 3mm stone in the L kidney:

## 2021-06-07 NOTE — ED ADULT NURSE REASSESSMENT NOTE - NS ED NURSE REASSESS COMMENT FT1
Report received from Landy Leal RN. Pt asleep in room under covers with eyes closed, arouses easily to voice, pt AAOx4, NAD, resp nonlabored, skin warm/dry, resting comfortably in bed with call bell at bedside. Pt denies any pain, n/v at this time. Pt awaiting bed, aware of plan of care. Safety maintained.

## 2021-06-07 NOTE — H&P ADULT - HISTORY OF PRESENT ILLNESS
54 y/o F with history of nephrolithiasis and lithotripsy in the past presenting with L flank pain radiating to the pelvis. She states that her symptoms started on Saturday, she had ongoing pain and pressure. She also had severe vomiting, unable to keep anything down. Denies dysuria, hematuria, fevers, chills, night sweats, diarrhea. She sees Dr. Mcgovern from [facility]. She denies any chest pain, syncope, vaginal bleeding. Her last kidney stone was before COVID.

## 2021-06-07 NOTE — H&P ADULT - NSICDXPASTMEDICALHX_GEN_ALL_CORE_FT
PAST MEDICAL HISTORY:  Endometriosis     Kidney stones     Migraine     Nephrolithiasis passed stones on own

## 2021-06-07 NOTE — H&P ADULT - PROBLEM SELECTOR PLAN 1
-3mm stone seen on admission CT  -continue with IV hydration and pain medication  -zofran PRN  -will start on ceftriaxone  -[facility] consult with Dr. Mcgovern  -monitor creatinine   -f/u urine and blood cultures -3mm stone seen on admission CT  -continue with IV hydration and pain medication  -zofran PRN  -will start on ceftriaxone  -[facility] consult with Dr. Mcgovern  -monitor creatinine   -f/u urine and blood cultures  -NPO for ureteroscopy today

## 2021-06-07 NOTE — ASU DISCHARGE PLAN (ADULT/PEDIATRIC) - ASU DC SPECIAL INSTRUCTIONSFT
PAIN CONTROL: You may take 650 mg of Tylenol every 4-6 hours. Do not exceed more than 4000mg or 4 grams of Tylenol daily. You may also take Motrin, as directed, as needed for pain. Please take Flomax 0.4mg at night to help with stent discomfort.     STENT: You may have intermittent pink tinged urine and slight flank pain when you urinate.  This is normal and due to the stent in your ureter. It is not uncommon to have some burning when you urinate.  Some patients do not feel any discomfort from the stent, while others may feel the sensation of needing to urinate frequently, burning on urination, or even back pain that worsens with urination. These symptoms usually improve gradually, however it may be necessary to take pain medication until the discomfort subsides.  If you are unable to urinate or your urine becomes bright red or with clots, please call the office. Please make sure you drink plenty of fluids.     ANTIBIOTICS: Please complete the course of antibiotics sent your pharmacy as instructed.    BATHING: Please do not submerge wound underwater. You may shower after 48 hours and/or sponge bathe.    ACTIVITY: No heavy lifting or straining. Otherwise, you may return to your usual level of physical activity. If you are taking narcotic pain medications (such as oxycodone), do NOT drive a car, operate machinery or make important decisions.    DIET: Return to your usual diet    NOTIFY YOUR SURGEON IF: You have any bleeding that does not stop, any fevers (over 100.4F) or chills, persistent nausea/vomiting, persistent diarrhea, your pain is not controlled on your discharge pain medications, or other worrisome symptoms arise.    FOLLOW UP:  1. Please call your surgeon to make a follow up appointment within one week of discharge

## 2021-06-07 NOTE — BRIEF OPERATIVE NOTE - NSICDXBRIEFPROCEDURE_GEN_ALL_CORE_FT
PROCEDURES:  Cystoureteroscopy, with lithotripsy, calculus removal, and ureteral stent insertion 07-Jun-2021 14:02:17  Toribio Zendejas

## 2021-06-07 NOTE — CONSULT NOTE ADULT - SUBJECTIVE AND OBJECTIVE BOX
History of Present Illness:  Reason for Admission: flank pain  History of Present Illness:   56 y/o F with history of nephrolithiasis and lithotripsy in the past presenting with L flank pain radiating to the pelvis. She states that her symptoms started on Saturday, she had ongoing pain and pressure. She also had severe vomiting, unable to keep anything down. Denies dysuria, hematuria, fevers, chills, night sweats, diarrhea. last us 2/21 no hydrp     Review of Systems:  · General	negative  · Skin/Breast	negative  · Ophthalmologic	negative  · ENMT	negative  · Respiratory and Thorax	negative  · Cardiovascular	negative  · Gastrointestinal Symptoms	nausea; vomiting  · General Genitourinary Symptoms	flank pain L  · Musculoskeletal	negative  · Neurological	negative  · Psychiatric	negative  · Hematology/Lymphatics	negative  · Endocrine	negative  · Allergic/Immunologic	negative      Allergies and Intolerances:        Allergies:  	penicillin: Drug, Hives  	Seasonal Allergies: Miscellaneous, Rhinorrhea, Seasonal Allergies    Home Medications:   * Patient Currently Takes Medications as of 30-Jan-2020 12:36 documented in Structured Notes  · 	cefpodoxime 200 mg oral tablet: 1 tab(s) orally 2 times a day   · 	Wellbutrin  mg/24 hours oral tablet, extended release: 1 tab(s) orally every 24 hours  · 	Relpax 40 mg oral tablet: 1 tab(s) orally 2 times a day, As Needed - max 2 tablets daily  · 	Imitrex 100 mg oral tablet: 1 tab(s) orally 2 times a day, As Needed - max 2 tabs/24 hours  · 	Botox:  injectable every 3 months - for migraines    .    Patient History:    Past Medical, Past Surgical, and Family History:  PAST MEDICAL HISTORY:  Endometriosis     Kidney stones     Migraine     Nephrolithiasis passed stones on own.     PAST SURGICAL HISTORY:  H/O umbilical hernia repair     History of endometrial ablation 7/2008    History of laparoscopy x 7 for endometriosis 3/93,6/93,12/97,5/00,3/02,3/04, 8/11.     FAMILY HISTORY:  No pertinent family history in first degree relatives.     No Pertinent Family History in first degree relatives of: Migraine.     Social History:  Social History (marital status, living situation, occupation, tobacco use, alcohol and drug use, and sexual history): denies alcohol/tobacco use     Tobacco Screening:  · Core Measure Site	No  · Has the patient used tobacco in the past 30 days?	No    Risk Assessment:    Present on Admission:  Deep Venous Thrombosis	no  Pulmonary Embolus	no  Urinary Catheter	no     Heart Failure:  Does this patient have a history of or has been diagnosed with heart failure? no.    HIV Screen (per [facility] Department of Health, HIV screening must be offered to every individual between ages 13 and 64)	Offered and patient declined    Physical Exam:    Physical Exam:  · Constitutional	detailed exam  · Constitutional Details	distress due to pain  · Eyes	detailed exam  · Eyes Details	EOMI; conjunctiva clear  · Neck	detailed exam  · Neck Details	supple; no JVD  · Respiratory	detailed exam  · Respiratory Details	airway patent; breath sounds equal; good air movement; respirations non-labored; clear to auscultation bilaterally  · Cardiovascular	detailed exam  · Cardiovascular Details	positive S1; positive S2  · Extremities	detailed exam  · Extremities Details	no pedal edema  · Neurological	detailed exam  · Neurological Details	alert and oriented x 3  · Musculoskeletal	detailed exam  · Musculoskeletal Details	no joint swelling; no joint erythema  · Psychiatric	Affect and characteristics of appearance, verbalizations, behaviors are appropriate    Assessment and Plan:    Assessment:  · Assessment	  56 y/o F with history of nephrolithiasis and lithotripsy in the past presenting with L flank pain radiating to the pelvis found to have L hydroureteronephrosis with 3mm stone in the L kidney:      Problem/Plan - 1:  ·  Problem: Ureteral stone.  Plan: -3mm stone seen on CT distal w sig hydro  small renal calc  -s/p  IV hydration and pain medication  on abx  for uscope  r/b/a/c rev w pt  aware poss failure and need for addnl procedures

## 2021-06-07 NOTE — PROGRESS NOTE ADULT - SUBJECTIVE AND OBJECTIVE BOX
Post op Check    Pt seen and examined without complaints. Pain is controlled. Denies SOB/CP/N/V.   Void 3 times. Feels like she completely emptied. Urine is clear pink, no clots.    Vital Signs Last 24 Hrs  T(C): 36.4 (07 Jun 2021 15:15), Max: 37 (07 Jun 2021 07:00)  T(F): 97.5 (07 Jun 2021 15:15), Max: 98.6 (07 Jun 2021 07:00)  HR: 58 (07 Jun 2021 15:15) (58 - 75)  BP: 140/63 (07 Jun 2021 15:15) (100/63 - 140/63)  BP(mean): 95 (07 Jun 2021 15:00) (76 - 95)  RR: 16 (07 Jun 2021 15:15) (16 - 22)  SpO2: 99% (07 Jun 2021 15:15) (97% - 100%)    I&O's Summary    07 Jun 2021 07:01  -  07 Jun 2021 15:28  --------------------------------------------------------  IN: 75 mL / OUT: 300 mL / NET: -225 mL        Physical Exam  Gen: NAD, A&Ox3  Pulm: No respiratory distress, no subcostal retractions  Abd: Soft, NT, ND. no rebound or guarding  Back: no CVAT bilaterally  : no suprapubic tenderness. voiding                          12.6   7.53  )-----------( 348      ( 06 Jun 2021 22:53 )             38.4       06-06    141  |  103  |  13  ----------------------------<  100<H>  3.8   |  25  |  0.96    Ca    9.4      06 Jun 2021 22:53    TPro  7.0  /  Alb  4.3  /  TBili  0.2  /  DBili  x   /  AST  13  /  ALT  11  /  AlkPhos  90  06-06

## 2021-06-08 LAB
CULTURE RESULTS: NO GROWTH — SIGNIFICANT CHANGE UP
SPECIMEN SOURCE: SIGNIFICANT CHANGE UP

## 2021-06-08 PROCEDURE — 82947 ASSAY GLUCOSE BLOOD QUANT: CPT

## 2021-06-08 PROCEDURE — 82365 CALCULUS SPECTROSCOPY: CPT

## 2021-06-08 PROCEDURE — 82803 BLOOD GASES ANY COMBINATION: CPT

## 2021-06-08 PROCEDURE — C1758: CPT

## 2021-06-08 PROCEDURE — 81001 URINALYSIS AUTO W/SCOPE: CPT

## 2021-06-08 PROCEDURE — 99285 EMERGENCY DEPT VISIT HI MDM: CPT | Mod: 25

## 2021-06-08 PROCEDURE — 85014 HEMATOCRIT: CPT

## 2021-06-08 PROCEDURE — 87086 URINE CULTURE/COLONY COUNT: CPT

## 2021-06-08 PROCEDURE — 80053 COMPREHEN METABOLIC PANEL: CPT

## 2021-06-08 PROCEDURE — 88300 SURGICAL PATH GROSS: CPT

## 2021-06-08 PROCEDURE — 76000 FLUOROSCOPY <1 HR PHYS/QHP: CPT

## 2021-06-08 PROCEDURE — 74176 CT ABD & PELVIS W/O CONTRAST: CPT

## 2021-06-08 PROCEDURE — C1769: CPT

## 2021-06-08 PROCEDURE — C2617: CPT

## 2021-06-08 PROCEDURE — 87635 SARS-COV-2 COVID-19 AMP PRB: CPT

## 2021-06-08 PROCEDURE — 85025 COMPLETE CBC W/AUTO DIFF WBC: CPT

## 2021-06-08 PROCEDURE — 82330 ASSAY OF CALCIUM: CPT

## 2021-06-08 PROCEDURE — 83605 ASSAY OF LACTIC ACID: CPT

## 2021-06-08 PROCEDURE — 83690 ASSAY OF LIPASE: CPT

## 2021-06-08 PROCEDURE — C1889: CPT

## 2021-06-08 PROCEDURE — 85018 HEMOGLOBIN: CPT

## 2021-06-08 PROCEDURE — 82435 ASSAY OF BLOOD CHLORIDE: CPT

## 2021-06-08 PROCEDURE — 84132 ASSAY OF SERUM POTASSIUM: CPT

## 2021-06-08 PROCEDURE — 84295 ASSAY OF SERUM SODIUM: CPT

## 2021-06-08 PROCEDURE — 84702 CHORIONIC GONADOTROPIN TEST: CPT

## 2021-06-09 LAB
CULTURE RESULTS: NO GROWTH — SIGNIFICANT CHANGE UP
SPECIMEN SOURCE: SIGNIFICANT CHANGE UP

## 2021-06-14 ENCOUNTER — TRANSCRIPTION ENCOUNTER (OUTPATIENT)
Age: 56
End: 2021-06-14

## 2021-06-19 ENCOUNTER — APPOINTMENT (OUTPATIENT)
Dept: CT IMAGING | Facility: IMAGING CENTER | Age: 56
End: 2021-06-19
Payer: COMMERCIAL

## 2021-06-19 ENCOUNTER — OUTPATIENT (OUTPATIENT)
Dept: OUTPATIENT SERVICES | Facility: HOSPITAL | Age: 56
LOS: 1 days | End: 2021-06-19
Payer: COMMERCIAL

## 2021-06-19 DIAGNOSIS — Z98.89 OTHER SPECIFIED POSTPROCEDURAL STATES: Chronic | ICD-10-CM

## 2021-06-19 DIAGNOSIS — Z00.8 ENCOUNTER FOR OTHER GENERAL EXAMINATION: ICD-10-CM

## 2021-06-19 DIAGNOSIS — K11.5 SIALOLITHIASIS: ICD-10-CM

## 2021-06-19 PROCEDURE — 70492 CT SFT TSUE NCK W/O & W/DYE: CPT

## 2021-06-19 PROCEDURE — 70492 CT SFT TSUE NCK W/O & W/DYE: CPT | Mod: 26

## 2021-07-14 LAB — SURGICAL PATHOLOGY STUDY: SIGNIFICANT CHANGE UP

## 2021-08-09 ENCOUNTER — APPOINTMENT (OUTPATIENT)
Dept: OTOLARYNGOLOGY | Facility: CLINIC | Age: 56
End: 2021-08-09
Payer: COMMERCIAL

## 2021-08-09 ENCOUNTER — NON-APPOINTMENT (OUTPATIENT)
Age: 56
End: 2021-08-09

## 2021-08-09 VITALS
HEIGHT: 62.5 IN | BODY MASS INDEX: 23.33 KG/M2 | HEART RATE: 80 BPM | TEMPERATURE: 97.9 F | SYSTOLIC BLOOD PRESSURE: 117 MMHG | DIASTOLIC BLOOD PRESSURE: 79 MMHG | WEIGHT: 130 LBS

## 2021-08-09 PROCEDURE — 99204 OFFICE O/P NEW MOD 45 MIN: CPT

## 2021-08-09 RX ORDER — CIPROFLOXACIN HYDROCHLORIDE 500 MG/1
500 TABLET, FILM COATED ORAL
Qty: 10 | Refills: 0 | Status: ACTIVE | COMMUNITY
Start: 2021-06-07

## 2021-08-09 RX ORDER — TIZANIDINE 2 MG/1
2 TABLET ORAL
Qty: 90 | Refills: 0 | Status: ACTIVE | COMMUNITY
Start: 2021-03-05

## 2021-08-09 RX ORDER — ALPRAZOLAM 0.25 MG/1
0.25 TABLET ORAL
Qty: 4 | Refills: 0 | Status: ACTIVE | COMMUNITY
Start: 2021-06-04

## 2021-08-09 RX ORDER — UBROGEPANT 100 MG/1
100 TABLET ORAL
Qty: 10 | Refills: 0 | Status: ACTIVE | COMMUNITY
Start: 2021-03-05

## 2021-08-09 RX ORDER — ERENUMAB-AOOE 70 MG/ML
70 INJECTION SUBCUTANEOUS
Qty: 3 | Refills: 0 | Status: ACTIVE | COMMUNITY
Start: 2020-12-15

## 2021-08-09 RX ORDER — CLOBETASOL PROPIONATE 0.5 MG/G
0.05 CREAM TOPICAL
Qty: 15 | Refills: 0 | Status: ACTIVE | COMMUNITY
Start: 2021-06-17

## 2021-08-09 RX ORDER — OXYCODONE AND ACETAMINOPHEN 5; 325 MG/1; MG/1
5-325 TABLET ORAL
Qty: 20 | Refills: 0 | Status: ACTIVE | COMMUNITY
Start: 2021-06-23

## 2021-08-09 RX ORDER — POTASSIUM CITRATE 10 MEQ/1
10 MEQ TABLET, EXTENDED RELEASE ORAL
Qty: 180 | Refills: 0 | Status: ACTIVE | COMMUNITY
Start: 2021-07-30

## 2021-08-09 RX ORDER — PREDNISONE 20 MG/1
20 TABLET ORAL
Qty: 5 | Refills: 0 | Status: ACTIVE | COMMUNITY
Start: 2021-06-11

## 2021-08-09 RX ORDER — TAMSULOSIN HYDROCHLORIDE 0.4 MG/1
0.4 CAPSULE ORAL
Qty: 30 | Refills: 0 | Status: ACTIVE | COMMUNITY
Start: 2021-06-07

## 2021-08-09 RX ORDER — SUMATRIPTAN 20 MG/1
20 SPRAY NASAL
Qty: 6 | Refills: 0 | Status: ACTIVE | COMMUNITY
Start: 2020-10-07

## 2021-08-09 RX ORDER — TRAZODONE HYDROCHLORIDE 50 MG/1
50 TABLET ORAL
Qty: 30 | Refills: 0 | Status: ACTIVE | COMMUNITY
Start: 2021-03-05

## 2021-08-09 RX ORDER — ELETRIPTAN HYDROBROMIDE 40 MG/1
40 TABLET, FILM COATED ORAL
Qty: 4 | Refills: 0 | Status: ACTIVE | COMMUNITY
Start: 2021-03-05

## 2021-08-09 RX ORDER — CLINDAMYCIN HYDROCHLORIDE 300 MG/1
300 CAPSULE ORAL
Qty: 30 | Refills: 0 | Status: ACTIVE | COMMUNITY
Start: 2021-06-17

## 2021-08-09 NOTE — PHYSICAL EXAM
[Midline] : trachea located in midline position [Normal] : no rashes [de-identified] : clear SMG flow R>L

## 2021-08-09 NOTE — HISTORY OF PRESENT ILLNESS
[de-identified] : Pt referred by Dr. Alexandrea Gardiner. 54 y/o F c/o R intermittent SMG x one year. not triggered by anything specific will swell up randomly. swelling can last up 1-2 weeks. will occur usually while eating \par pt admits to it being swollen sat and Sunday, now improving. \par Pt has tried steroids with no use, also tried sour candies, drinking water, and massaging with no help. \par Pt did not try getting abx, however did receive abx for something else but feels it did not help her sxs.\par CT done [facility] \par +dry mouth occ \par Pt denies autoimmune d/o- not been tested for sjogrens

## 2021-08-09 NOTE — ASSESSMENT
[FreeTextEntry1] : 54 y/o F who presents with R SMG sialoadenitis on exam clear SMG flow R>L\par - CT done- WNL however pt will obstructive sxs and swelling with eating \par - Will proceed with regiment to increase salivary flow to reduce pooling in the gland and washout any possible obstruction if possible. Recommended hydration, regular massage, sour candies, and warm compress\par - Discussed options for recurrent submandibular sialoadenitis- observation with conservative management versus interventional sialoendoscopy vs excision of gland. \par Discussed details of the regiment/procedures and risks of each including but not limited to:\par interventional sialoendoscopy - bleeding, infection, scarring, inability to remove stone, ranula, ductal perforation/avulsion, injury to surrounding nerves, seroma, persistent sx \par Combined approach - increased risk of bleeding and infections injury to surrounding nerves including lingual, hypoglossal and sensory nerves. \par complete gland removal - external scar, injury to marginal mandibular nerve etc. \par Would like to do sialoendoscopy. Will book for procedure.\par likely just a clean out and stent, can do awake if she would like

## 2021-08-09 NOTE — CONSULT LETTER
[Please see my note below.] : Please see my note below. [FreeTextEntry2] : ENT [FreeTextEntry1] : Dear Dr. Cordero \par I had the pleasure of evaluating your patient KEATON GUY, thank you for allowing us to participate in their care. please see full note detailing our visit below.\par If you have any questions, please do not hesitate to call me and I would be happy to discuss further. \par \par Jaziel Garcia M.D.\par Attending Physician,  \par Department of Otolaryngology - Head and Neck Surgery\par [facility] \par Office: [phone]\par Fax: [phone]\par \par

## 2021-11-19 ENCOUNTER — OUTPATIENT (OUTPATIENT)
Dept: OUTPATIENT SERVICES | Facility: HOSPITAL | Age: 56
LOS: 1 days | End: 2021-11-19
Payer: COMMERCIAL

## 2021-11-19 VITALS
HEIGHT: 63 IN | RESPIRATION RATE: 16 BRPM | WEIGHT: 117.95 LBS | OXYGEN SATURATION: 100 % | SYSTOLIC BLOOD PRESSURE: 108 MMHG | HEART RATE: 86 BPM | DIASTOLIC BLOOD PRESSURE: 74 MMHG | TEMPERATURE: 99 F

## 2021-11-19 DIAGNOSIS — Z98.89 OTHER SPECIFIED POSTPROCEDURAL STATES: Chronic | ICD-10-CM

## 2021-11-19 DIAGNOSIS — Z90.722 ACQUIRED ABSENCE OF OVARIES, BILATERAL: Chronic | ICD-10-CM

## 2021-11-19 DIAGNOSIS — K11.20 SIALOADENITIS, UNSPECIFIED: ICD-10-CM

## 2021-11-19 DIAGNOSIS — G43.909 MIGRAINE, UNSPECIFIED, NOT INTRACTABLE, WITHOUT STATUS MIGRAINOSUS: ICD-10-CM

## 2021-11-19 DIAGNOSIS — Z98.890 OTHER SPECIFIED POSTPROCEDURAL STATES: Chronic | ICD-10-CM

## 2021-11-19 DIAGNOSIS — Z01.818 ENCOUNTER FOR OTHER PREPROCEDURAL EXAMINATION: ICD-10-CM

## 2021-11-19 LAB
ANION GAP SERPL CALC-SCNC: 12 MMOL/L — SIGNIFICANT CHANGE UP (ref 5–17)
BUN SERPL-MCNC: 11 MG/DL — SIGNIFICANT CHANGE UP (ref 7–23)
CALCIUM SERPL-MCNC: 9.2 MG/DL — SIGNIFICANT CHANGE UP (ref 8.4–10.5)
CHLORIDE SERPL-SCNC: 102 MMOL/L — SIGNIFICANT CHANGE UP (ref 96–108)
CO2 SERPL-SCNC: 25 MMOL/L — SIGNIFICANT CHANGE UP (ref 22–31)
CREAT SERPL-MCNC: 0.64 MG/DL — SIGNIFICANT CHANGE UP (ref 0.5–1.3)
GLUCOSE SERPL-MCNC: 91 MG/DL — SIGNIFICANT CHANGE UP (ref 70–99)
HCT VFR BLD CALC: 38 % — SIGNIFICANT CHANGE UP (ref 34.5–45)
HGB BLD-MCNC: 12.5 G/DL — SIGNIFICANT CHANGE UP (ref 11.5–15.5)
MCHC RBC-ENTMCNC: 32 PG — SIGNIFICANT CHANGE UP (ref 27–34)
MCHC RBC-ENTMCNC: 32.9 GM/DL — SIGNIFICANT CHANGE UP (ref 32–36)
MCV RBC AUTO: 97.2 FL — SIGNIFICANT CHANGE UP (ref 80–100)
NRBC # BLD: 0 /100 WBCS — SIGNIFICANT CHANGE UP (ref 0–0)
PLATELET # BLD AUTO: 391 K/UL — SIGNIFICANT CHANGE UP (ref 150–400)
POTASSIUM SERPL-MCNC: 3.3 MMOL/L — LOW (ref 3.5–5.3)
POTASSIUM SERPL-SCNC: 3.3 MMOL/L — LOW (ref 3.5–5.3)
RBC # BLD: 3.91 M/UL — SIGNIFICANT CHANGE UP (ref 3.8–5.2)
RBC # FLD: 12.5 % — SIGNIFICANT CHANGE UP (ref 10.3–14.5)
SODIUM SERPL-SCNC: 139 MMOL/L — SIGNIFICANT CHANGE UP (ref 135–145)
WBC # BLD: 5.96 K/UL — SIGNIFICANT CHANGE UP (ref 3.8–10.5)
WBC # FLD AUTO: 5.96 K/UL — SIGNIFICANT CHANGE UP (ref 3.8–10.5)

## 2021-11-19 PROCEDURE — 36415 COLL VENOUS BLD VENIPUNCTURE: CPT

## 2021-11-19 PROCEDURE — 85027 COMPLETE CBC AUTOMATED: CPT

## 2021-11-19 PROCEDURE — 80048 BASIC METABOLIC PNL TOTAL CA: CPT

## 2021-11-19 PROCEDURE — G0463: CPT

## 2021-11-19 RX ORDER — UBROGEPANT 100 MG/1
1 TABLET ORAL
Qty: 0 | Refills: 0 | DISCHARGE

## 2021-11-19 NOTE — H&P PST ADULT - NSICDXPASTMEDICALHX_GEN_ALL_CORE_FT
PAST MEDICAL HISTORY:  Endometriosis     Migraine     Nephrolithiasis passed stones on own     PAST MEDICAL HISTORY:  Endometriosis     Migraine     Nephrolithiasis     Sialoadenitis Right

## 2021-11-19 NOTE — H&P PST ADULT - NSICDXFAMILYHX_GEN_ALL_CORE_FT
FAMILY HISTORY:  No pertinent family history in first degree relatives     FAMILY HISTORY:  Father  Still living? No  Family history of premature CAD, Age at diagnosis: Age Unknown

## 2021-11-19 NOTE — H&P PST ADULT - NS PRO TALK SOMEONE YN
[FreeTextEntry1] : Ms. CHÁVEZ is a 76 year year old  female who  returns today in follow up with regard to a history of type 2 diabetes mellitus.  No known microvascular complications. Las t A1C  7.6 % and POCT bg 219 mg/dl \par Had low sodium-ws to repeat -hd not done so . Sodium low  chronically  Current dm medication include  Janumet 50/1,000 mg bid and Amaryl 1 mg 1/2 tablet daily .  HGM of late has shown values to  in am 150-180's  and prior   lunch    mg/dl and prior dinner 148-170 mg/dl.  There has been no significant hypoglycemia.  denies any chest pain, sob, neurologic or ophthalmologic complaints. She  too denies any new podiatric concerns. She  is up to date with his ophthalmologic visit.\par Additional medical history includes that of hyperlipidemia and hypertension, gerd and anxiety.  She is on Pravastatin along with enalapril and omeprazole.She too is on D3 2,000 iu daily and b-12 1,0000 mcg daily along with co q 10 200 mg.\par She to has hx of iron defic anemia-stable -follows with Dr. Tran. \par \par \par 
no

## 2021-11-19 NOTE — H&P PST ADULT - BP NONINVASIVE SYSTOLIC (MM HG)
Cardiovascular Disease Progress Note    Overnight events: No acute events overnight. Mr. Crowe is resting comfortably. He denies chest pain or SOB.     Otherwise review of systems negative    Objective Findings:  T(C): 36.6 (07-26-19 @ 20:34), Max: 36.6 (07-26-19 @ 12:29)  HR: 75 (07-26-19 @ 20:34) (75 - 89)  BP: 127/86 (07-27-19 @ 05:14) (127/86 - 150/88)  RR: 18 (07-27-19 @ 05:14) (18 - 18)  SpO2: 100% (07-27-19 @ 05:14) (99% - 100%)  Wt(kg): --  Daily     Daily       Physical Exam:  Gen: NAD  HEENT: EOMI  CV: IIR, normal S1 + S2, no m/r/g  Lungs: CTAB  Abd: soft, non-tender  Ext: No edema    Telemetry: n/a    Laboratory Data:                        11.4   6.10  )-----------( 189      ( 25 Jul 2019 11:00 )             38.0     07-26    143  |  107  |  16  ----------------------------<  79  3.7   |  25  |  1.06    Ca    10.0      26 Jul 2019 06:06  Phos  2.9     07-26  Mg     1.6     07-26    TPro  7.8  /  Alb  4.5  /  TBili  0.9  /  DBili  x   /  AST  20  /  ALT  11  /  AlkPhos  63  07-25              Inpatient Medications:  MEDICATIONS  (STANDING):  atorvastatin 10 milliGRAM(s) Oral at bedtime  clopidogrel Tablet 75 milliGRAM(s) Oral daily  diltiazem    milliGRAM(s) Oral daily  donepezil 5 milliGRAM(s) Oral at bedtime  multivitamin 1 Tablet(s) Oral daily  sodium chloride 0.9%. 1000 milliLiter(s) (75 mL/Hr) IV Continuous <Continuous>  tamsulosin 0.4 milliGRAM(s) Oral at bedtime  thiamine 100 milliGRAM(s) Oral daily      Assessment: 86 year old man with dementia, HTN, HLD, and a-fib presents with behavioral disturbance and worsening dementia.     Plan of Care:    #A-fib-  Currently rate controlled on Cardizem.  Given frequent agitation in the setting of worsening dementia, patient is a high fall risk.  Therefore, the risks of AC outweigh the benefits at this time.    #HTN-  BP acceptable.  Continue current regimen to avoid symptomatic hypotension in the elderly.     #HLD-  Statin as ordered.    CT abdomen for foreign body ingestion, as per primary team.     Over 25 minutes spent on total encounter; more than 50% of the visit was spent counseling and/or coordinating care by the attending physician.      Ronald Williamson MD [facility]  Cardiovascular Disease  [phone]

## 2021-11-19 NOTE — H&P PST ADULT - NSICDXFAMHXNEG_GEN_ALL
asthma/atrial fibrillation/coronary disease/diabetes/heart disease/hypertension/irritable bowel syndrome

## 2021-11-19 NOTE — H&P PST ADULT - NSANTHOSAYNRD_GEN_A_CORE
No. LEONARDO screening performed.  STOP BANG Legend: 0-2 = LOW Risk; 3-4 = INTERMEDIATE Risk; 5-8 = HIGH Risk

## 2021-11-19 NOTE — H&P PST ADULT - PROBLEM SELECTOR PLAN 1
Interventional right submandibular gland sialoadenoscopy, sialodochoplasty. possible stone removal on 12/1/21.   labs- CBC, BMP  Pre op instructions discussed

## 2021-11-19 NOTE — H&P PST ADULT - NSICDXPASTSURGICALHX_GEN_ALL_CORE_FT
PAST SURGICAL HISTORY:  H/O bilateral salpingo-oophorectomy 7/2016    H/O umbilical hernia repair     History of endometrial ablation 7/2008    History of laparoscopy x 7 for endometriosis 3/93,6/93,12/97,5/00,3/02,3/04, 8/11    History of lithotripsy 6/2021

## 2021-11-19 NOTE — H&P PST ADULT - HISTORY OF PRESENT ILLNESS
57 y/o F with history of nephrolithiasis rs, chills, night sweats, diarrhea. She sees Dr. Mcgovern from [facility] for U[facility]. She denies any chest pain, syncope, vaginal bleeding. Her last kidney stone was before COVID.  55 y/o F with history of nephrolithiasis, migraine HA c/o right salivary gland swelling  since 1/2021. Pt had ENT consult- s/p CT scan sialoadenitis- scheduled for interventional right submandibular gland sialoadenoscopy, sialodochoplasty. possible stone removal on 12/1/21.   Pt denies any fever, chills, recent travel or Covid 19 related infections    **Covid 19 PCR swab on 11/28/21 57 y/o F with history of nephrolithiasis, migraine HA c/o right salivary gland swelling  since 1/2021. Pt had ENT consult- s/p CT scan sialoadenitis- scheduled for interventional right submandibular gland sialoadenoscopy, sialodochoplasty. possible stone removal on 12/1/21.   Pt denies any fever, chills, recent travel or Covid 19 related infections    **Covid 19 PCR swab on 11/28/21 11/22/21. Addendum: K+ 3.3. Called patient, she states she has chronic n/v due to migraines. Patient will call PCP today to address. Will do BMP stat on admission.   Elida Em NP.

## 2021-11-24 RX ORDER — METHYLPREDNISOLONE 4 MG/1
4 TABLET ORAL
Qty: 1 | Refills: 0 | Status: ACTIVE | COMMUNITY
Start: 2021-11-24 | End: 1900-01-01

## 2021-11-24 RX ORDER — DOXYCYCLINE 100 MG/1
100 CAPSULE ORAL
Qty: 20 | Refills: 0 | Status: ACTIVE | COMMUNITY
Start: 2021-11-24 | End: 1900-01-01

## 2021-11-24 RX ORDER — OXYCODONE AND ACETAMINOPHEN 5; 325 MG/1; MG/1
5-325 TABLET ORAL
Qty: 18 | Refills: 0 | Status: ACTIVE | COMMUNITY
Start: 2021-11-24 | End: 1900-01-01

## 2022-02-23 RX ORDER — DOXYCYCLINE 100 MG/1
100 CAPSULE ORAL
Qty: 20 | Refills: 0 | Status: ACTIVE | COMMUNITY
Start: 2022-02-23 | End: 1900-01-01

## 2022-02-23 RX ORDER — OXYCODONE AND ACETAMINOPHEN 5; 325 MG/1; MG/1
5-325 TABLET ORAL
Qty: 18 | Refills: 0 | Status: ACTIVE | COMMUNITY
Start: 2022-02-23 | End: 1900-01-01

## 2022-02-23 RX ORDER — METHYLPREDNISOLONE 4 MG/1
4 TABLET ORAL
Qty: 1 | Refills: 0 | Status: ACTIVE | COMMUNITY
Start: 2022-02-23 | End: 1900-01-01

## 2022-02-24 ENCOUNTER — OUTPATIENT (OUTPATIENT)
Dept: OUTPATIENT SERVICES | Facility: HOSPITAL | Age: 57
LOS: 1 days | End: 2022-02-24
Payer: COMMERCIAL

## 2022-02-24 VITALS
HEIGHT: 63 IN | WEIGHT: 119.05 LBS | TEMPERATURE: 99 F | RESPIRATION RATE: 14 BRPM | DIASTOLIC BLOOD PRESSURE: 77 MMHG | SYSTOLIC BLOOD PRESSURE: 111 MMHG | HEART RATE: 82 BPM | OXYGEN SATURATION: 100 %

## 2022-02-24 DIAGNOSIS — Z98.89 OTHER SPECIFIED POSTPROCEDURAL STATES: Chronic | ICD-10-CM

## 2022-02-24 DIAGNOSIS — Z01.818 ENCOUNTER FOR OTHER PREPROCEDURAL EXAMINATION: ICD-10-CM

## 2022-02-24 DIAGNOSIS — Z90.722 ACQUIRED ABSENCE OF OVARIES, BILATERAL: Chronic | ICD-10-CM

## 2022-02-24 DIAGNOSIS — K11.20 SIALOADENITIS, UNSPECIFIED: ICD-10-CM

## 2022-02-24 DIAGNOSIS — Z98.890 OTHER SPECIFIED POSTPROCEDURAL STATES: Chronic | ICD-10-CM

## 2022-02-24 LAB
ANION GAP SERPL CALC-SCNC: 10 MMOL/L — SIGNIFICANT CHANGE UP (ref 5–17)
BUN SERPL-MCNC: 14 MG/DL — SIGNIFICANT CHANGE UP (ref 7–23)
CALCIUM SERPL-MCNC: 9.2 MG/DL — SIGNIFICANT CHANGE UP (ref 8.4–10.5)
CHLORIDE SERPL-SCNC: 104 MMOL/L — SIGNIFICANT CHANGE UP (ref 96–108)
CO2 SERPL-SCNC: 25 MMOL/L — SIGNIFICANT CHANGE UP (ref 22–31)
CREAT SERPL-MCNC: 0.7 MG/DL — SIGNIFICANT CHANGE UP (ref 0.5–1.3)
GLUCOSE SERPL-MCNC: 95 MG/DL — SIGNIFICANT CHANGE UP (ref 70–99)
HCT VFR BLD CALC: 38.7 % — SIGNIFICANT CHANGE UP (ref 34.5–45)
HGB BLD-MCNC: 12.5 G/DL — SIGNIFICANT CHANGE UP (ref 11.5–15.5)
MCHC RBC-ENTMCNC: 30.8 PG — SIGNIFICANT CHANGE UP (ref 27–34)
MCHC RBC-ENTMCNC: 32.3 GM/DL — SIGNIFICANT CHANGE UP (ref 32–36)
MCV RBC AUTO: 95.3 FL — SIGNIFICANT CHANGE UP (ref 80–100)
NRBC # BLD: 0 /100 WBCS — SIGNIFICANT CHANGE UP (ref 0–0)
PLATELET # BLD AUTO: 340 K/UL — SIGNIFICANT CHANGE UP (ref 150–400)
POTASSIUM SERPL-MCNC: 3.9 MMOL/L — SIGNIFICANT CHANGE UP (ref 3.5–5.3)
POTASSIUM SERPL-SCNC: 3.9 MMOL/L — SIGNIFICANT CHANGE UP (ref 3.5–5.3)
RBC # BLD: 4.06 M/UL — SIGNIFICANT CHANGE UP (ref 3.8–5.2)
RBC # FLD: 12.1 % — SIGNIFICANT CHANGE UP (ref 10.3–14.5)
SODIUM SERPL-SCNC: 139 MMOL/L — SIGNIFICANT CHANGE UP (ref 135–145)
WBC # BLD: 5.49 K/UL — SIGNIFICANT CHANGE UP (ref 3.8–10.5)
WBC # FLD AUTO: 5.49 K/UL — SIGNIFICANT CHANGE UP (ref 3.8–10.5)

## 2022-02-24 PROCEDURE — G0463: CPT

## 2022-02-24 PROCEDURE — 85027 COMPLETE CBC AUTOMATED: CPT

## 2022-02-24 PROCEDURE — 42699B: CUSTOM

## 2022-02-24 PROCEDURE — 80048 BASIC METABOLIC PNL TOTAL CA: CPT

## 2022-02-24 PROCEDURE — 36415 COLL VENOUS BLD VENIPUNCTURE: CPT

## 2022-02-24 PROCEDURE — 42505 REPAIR SALIVARY DUCT: CPT | Mod: RT

## 2022-02-24 RX ORDER — SODIUM CHLORIDE 9 MG/ML
1000 INJECTION, SOLUTION INTRAVENOUS
Refills: 0 | Status: DISCONTINUED | OUTPATIENT
Start: 2022-03-02 | End: 2022-03-16

## 2022-02-24 RX ORDER — LIDOCAINE HCL 20 MG/ML
0.2 VIAL (ML) INJECTION ONCE
Refills: 0 | Status: DISCONTINUED | OUTPATIENT
Start: 2022-03-02 | End: 2022-03-16

## 2022-02-24 RX ORDER — SODIUM CHLORIDE 9 MG/ML
3 INJECTION INTRAMUSCULAR; INTRAVENOUS; SUBCUTANEOUS EVERY 8 HOURS
Refills: 0 | Status: DISCONTINUED | OUTPATIENT
Start: 2022-03-02 | End: 2022-03-16

## 2022-02-24 RX ORDER — IBUPROFEN 200 MG
4 TABLET ORAL
Qty: 0 | Refills: 0 | DISCHARGE

## 2022-02-24 NOTE — H&P PST ADULT - FALL HARM RISK - UNIVERSAL INTERVENTIONS
Bed in lowest position, wheels locked, appropriate side rails in place/Call bell, personal items and telephone in reach/Instruct patient to call for assistance before getting out of bed or chair/Non-slip footwear when patient is out of bed/[place] to call system/Physically safe environment - no spills, clutter or unnecessary equipment/Purposeful Proactive Rounding/Room/bathroom lighting operational, light cord in reach

## 2022-02-24 NOTE — H&P PST ADULT - NSICDXFAMILYHX_GEN_ALL_CORE_FT
FAMILY HISTORY:  Father  Still living? No  Family history of premature CAD, Age at diagnosis: Age Unknown

## 2022-02-24 NOTE — H&P PST ADULT - NSICDXPASTMEDICALHX_GEN_ALL_CORE_FT
PAST MEDICAL HISTORY:  2019 novel coronavirus disease (COVID-19) Covid + 1/4/22 Symptoms of sore throat x few days; No SOB/No Hospitalizations - Managed conservatively at home      Endometriosis     Migraine with N/V    Nephrolithiasis s/p lithotripsy 6/2021 with ureteral stent and removal    Sialoadenitis Right side

## 2022-02-24 NOTE — H&P PST ADULT - NSANTHOBSERVEDRD_ENT_A_CORE
Per Dr. Charlee Francisco, patient is to hold Plavix and Protonix for 3 days and to have a chest xray. Patient states he will hold both medications as instructed but wants to hold off on chest xray to see if he feels better. No

## 2022-02-24 NOTE — H&P PST ADULT - HISTORY OF PRESENT ILLNESS
56 year old female with PMH nephrolithiasis and migraine HA with c/o right salivary gland swelling since 1/2021. Pt had ENT consult s/p CT scan revealed sialoadenitis. Pt was originally scheduled to have an interventional right submandibular gland sialoadenoscopy, sialodochoplasty and possible stone removal on 12/1/21, however patient's potassium level drawn at [facility] came back 3.3. Pt states that she has chronic N/V due to migraines and followed up with PCP. Pt now presents to [facility] for scheduled interventional right submandibular gland sialoendoscopy, sialochoplasty and possible stone removal [facility].  Pt denies any fever, chills, recent travel or Covid 19 related infections     Covid vaccine 2nd Dose 4/8/2021; Pfizer Booster received 12/17/21  Denies Recent travel, Exposure or Covid symptoms  Covid PCR test scheduled for 2/27/22 at [facility]  No recent hospitalizations over the past 3 months  Covid + 1/4/22 Symptoms of sore throat x few days; No SOB/No Hospitalizations - Managed conservatively at home 56 year old female with PMH nephrolithiasis (s/p lithotripsy and stent placement 6/2021) and migraine HA reports c/o right salivary gland swelling since 1/2021. Pt had ENT consult s/p CT scan revealed sialoadenitis. Pt was originally scheduled to have an interventional right submandibular gland sialoendoscopy, sialodochoplasty and possible stone removal on 12/1/21, however patient's potassium level drawn at PST came back 3.3. Pt states that she has chronic N/V due to migraines and followed up with PCP. As per patient, no intervention with PCP as potassium levels were not abnormal at check up. Pt now presents to [facility] for scheduled interventional right submandibular gland sialoendoscopy, sialodochoplasty and possible stone removal with Dr. [facility] on 3/2/22.    Covid vaccine 2nd Dose 4/8/2021; Pfizer Booster received 12/17/21  Denies Recent travel, Exposure or Covid symptoms  Covid + 1/4/22 Symptoms of sore throat x few days; No SOB/No Hospitalizations - Managed conservatively at home  No Covid Pre-Op test required as per Brittany's Protocol - Pt advised to e-mail Covid + PCR results to Dr. Garcia' office  No recent hospitalizations over the past 3 months 56 year old female with PMH nephrolithiasis (s/p lithotripsy and stent placement 6/2021) and migraine HA reports c/o right salivary gland swelling since 1/2021. Pt had ENT consult s/p CT scan revealed sialoadenitis. Pt was originally scheduled to have an interventional right submandibular gland sialoendoscopy, sialodochoplasty and possible stone removal on 12/1/21, however patient's potassium level drawn at [facility] came back 3.3. Pt states that she has chronic N/V due to migraines and followed up with PCP. As per patient, no intervention with PCP as potassium levels were not abnormal at check up. Pt now presents to [facility] for scheduled interventional right submandibular gland sialoendoscopy, sialodochoplasty and possible stone removal with Dr. [facility] on 3/2/22.    Covid vaccine 2nd Dose 4/8/2021; Pfizer Booster received 12/17/21  Denies Recent travel, Exposure or Covid symptoms  Covid + 1/4/22 Symptoms of sore throat x few days; No SOB/No Hospitalizations - Managed conservatively at home  Covid PCR test scheduled for 2/27/22 at [facility]  Pt advised to e-mail Covid + PCR results to Dr. Garcia' office  No recent hospitalizations over the past 3 months

## 2022-02-24 NOTE — H&P PST ADULT - NSICDXPASTSURGICALHX_GEN_ALL_CORE_FT
PAST SURGICAL HISTORY:  H/O bilateral salpingo-oophorectomy 7/2016    H/O umbilical hernia repair "long time ago" possibly with mesh    History of endometrial ablation 7/2008    History of laparoscopy x 7 for endometriosis 3/93,6/93,12/97,5/00,3/02,3/04, 8/11    History of lithotripsy 6/2021

## 2022-02-24 NOTE — H&P PST ADULT - PROBLEM SELECTOR PLAN 1
Pt is scheduled for an interventional right submandibular gland sialoendoscopy, sialodochoplasty and possible stone removal with Dr. Garcia on 3/2/22  Pt advised to e-mail Covid + PCR results to Dr. Garcia' office  No pre-op Covid test scheduled as per Brittany's protocol  CBC, BMP ordered and obtained at PST  1 L NS bolus ordered pre-op DOS as per Dr. Garcia  Emend ordered pre-op DOS for history of PONV Pt is scheduled for an interventional right submandibular gland sialoendoscopy, sialodochoplasty and possible stone removal with Dr. Garcia on 3/2/22  Pt advised to e-mail Covid + PCR results to Dr. Garcia' office  No pre-op Covid test scheduled as per Brittany's protocol; OR Scheduling and Jocelyne Ye Notified via email  CBC, BMP ordered and obtained at PST  1 L LR bolus ordered pre-op DOS as per Dr. Garcia  Emend ordered pre-op DOS for history of PONV Pt is scheduled for an interventional right submandibular gland sialoendoscopy, sialodochoplasty and possible stone removal with Dr. Garcia on 3/2/22  Covid PCR test scheduled for 2/27/22 at [facility]  Pt advised to e-mail Covid + PCR results to Dr. Garcia' office  CBC, BMP ordered and obtained at PST  1 L LR bolus ordered pre-op DOS as per Dr. Garcia  Emend ordered pre-op DOS for history of PONV

## 2022-03-01 ENCOUNTER — TRANSCRIPTION ENCOUNTER (OUTPATIENT)
Age: 57
End: 2022-03-01

## 2022-03-02 ENCOUNTER — OUTPATIENT (OUTPATIENT)
Dept: OUTPATIENT SERVICES | Facility: HOSPITAL | Age: 57
LOS: 1 days | End: 2022-03-02
Payer: COMMERCIAL

## 2022-03-02 ENCOUNTER — APPOINTMENT (OUTPATIENT)
Dept: OTOLARYNGOLOGY | Facility: HOSPITAL | Age: 57
End: 2022-03-02

## 2022-03-02 VITALS
DIASTOLIC BLOOD PRESSURE: 82 MMHG | OXYGEN SATURATION: 100 % | HEART RATE: 70 BPM | SYSTOLIC BLOOD PRESSURE: 120 MMHG | RESPIRATION RATE: 17 BRPM

## 2022-03-02 VITALS
OXYGEN SATURATION: 100 % | HEART RATE: 75 BPM | TEMPERATURE: 98 F | WEIGHT: 119.05 LBS | RESPIRATION RATE: 16 BRPM | DIASTOLIC BLOOD PRESSURE: 63 MMHG | HEIGHT: 63 IN | SYSTOLIC BLOOD PRESSURE: 98 MMHG

## 2022-03-02 DIAGNOSIS — Z98.89 OTHER SPECIFIED POSTPROCEDURAL STATES: Chronic | ICD-10-CM

## 2022-03-02 DIAGNOSIS — Z98.890 OTHER SPECIFIED POSTPROCEDURAL STATES: Chronic | ICD-10-CM

## 2022-03-02 DIAGNOSIS — K11.20 SIALOADENITIS, UNSPECIFIED: ICD-10-CM

## 2022-03-02 DIAGNOSIS — Z90.722 ACQUIRED ABSENCE OF OVARIES, BILATERAL: Chronic | ICD-10-CM

## 2022-03-02 PROCEDURE — 42505 REPAIR SALIVARY DUCT: CPT | Mod: RT

## 2022-03-02 PROCEDURE — C1769: CPT

## 2022-03-02 PROCEDURE — 42699B: CUSTOM

## 2022-03-02 PROCEDURE — C1889: CPT

## 2022-03-02 DEVICE — IMPLANTABLE DEVICE: Type: IMPLANTABLE DEVICE | Site: RIGHT | Status: FUNCTIONAL

## 2022-03-02 DEVICE — CANNULA SCHAITKIN SALIVARY DUCT 1.5 W/ GUIDEWIRE: Type: IMPLANTABLE DEVICE | Site: RIGHT | Status: FUNCTIONAL

## 2022-03-02 RX ORDER — SODIUM CHLORIDE 9 MG/ML
1000 INJECTION, SOLUTION INTRAVENOUS ONCE
Refills: 0 | Status: COMPLETED | OUTPATIENT
Start: 2022-03-02 | End: 2022-03-02

## 2022-03-02 RX ORDER — ONDANSETRON 8 MG/1
1 TABLET, FILM COATED ORAL
Qty: 0 | Refills: 0 | DISCHARGE

## 2022-03-02 RX ORDER — HYDROMORPHONE HYDROCHLORIDE 2 MG/ML
0.5 INJECTION INTRAMUSCULAR; INTRAVENOUS; SUBCUTANEOUS
Refills: 0 | Status: DISCONTINUED | OUTPATIENT
Start: 2022-03-02 | End: 2022-03-02

## 2022-03-02 RX ORDER — ONDANSETRON 8 MG/1
4 TABLET, FILM COATED ORAL ONCE
Refills: 0 | Status: DISCONTINUED | OUTPATIENT
Start: 2022-03-02 | End: 2022-03-16

## 2022-03-02 RX ORDER — SUMATRIPTAN SUCCINATE 4 MG/.5ML
1 INJECTION, SOLUTION SUBCUTANEOUS
Qty: 0 | Refills: 0 | DISCHARGE

## 2022-03-02 RX ORDER — ERENUMAB-AOOE 70 MG/ML
70 INJECTION SUBCUTANEOUS
Qty: 0 | Refills: 0 | DISCHARGE

## 2022-03-02 RX ORDER — APREPITANT 80 MG/1
40 CAPSULE ORAL ONCE
Refills: 0 | Status: COMPLETED | OUTPATIENT
Start: 2022-03-02 | End: 2022-03-02

## 2022-03-02 RX ADMIN — SODIUM CHLORIDE 1000 MILLILITER(S): 9 INJECTION, SOLUTION INTRAVENOUS at 09:30

## 2022-03-02 RX ADMIN — SODIUM CHLORIDE 100 MILLILITER(S): 9 INJECTION, SOLUTION INTRAVENOUS at 09:29

## 2022-03-02 RX ADMIN — SODIUM CHLORIDE 3 MILLILITER(S): 9 INJECTION INTRAMUSCULAR; INTRAVENOUS; SUBCUTANEOUS at 09:29

## 2022-03-02 RX ADMIN — SODIUM CHLORIDE 100 MILLILITER(S): 9 INJECTION, SOLUTION INTRAVENOUS at 11:01

## 2022-03-02 RX ADMIN — APREPITANT 40 MILLIGRAM(S): 80 CAPSULE ORAL at 09:26

## 2022-03-02 NOTE — ASU DISCHARGE PLAN (ADULT/PEDIATRIC) - NS MD DC FALL RISK RISK
For information on Fall & Injury Prevention, visit: [email or web address] OR  [email or web address] OR  https://www.cdc.gov/steadi/patient.html

## 2022-03-02 NOTE — ASU DISCHARGE PLAN (ADULT/PEDIATRIC) - ASU DC SPECIAL INSTRUCTIONSFT
No nose blowing for 2 weeks, cough/sneeze through an open mouth   No straining for two weeks  Complete antibiotics and steroids as directed on prescription  Pain medication as needed - do not drive, make decisions or operate machinery on pain meds. if constipates take stool softener, do not strain.   Expect oozing from your nose, you can change the "mustache dressing" as needed. If bleeding becomes brisk -  place Afrin (the nasal spray) on a cotton ball, put it in your nose, pinch and call my office/ go to the ED if does not slow in 10 minutes.  you can start using nasal wash tomorrow, 4 times a day if possible  I will see you in one week and clean everything out/ take out splints   Call with any concerns

## 2022-03-02 NOTE — ASU PATIENT PROFILE, ADULT - FALL HARM RISK - UNIVERSAL INTERVENTIONS
Bed in lowest position, wheels locked, appropriate side rails in place/Call bell, personal items and telephone in reach/Instruct patient to call for assistance before getting out of bed or chair/Non-slip footwear when patient is out of bed/[place] to call system/Physically safe environment - no spills, clutter or unnecessary equipment/Purposeful Proactive Rounding/Room/bathroom lighting operational, light cord in reach

## 2022-03-02 NOTE — ASU DISCHARGE PLAN (ADULT/PEDIATRIC) - NURSING INSTRUCTIONS
Next dose of Tylenol will be on or after 6pm ,today/tonight, If needed for pain/cramps. Your first dose of Tylenol was given at 12pm Do not exceed more than 4000mg of Tylenol in one 24 hour setting.

## 2022-03-03 ENCOUNTER — NON-APPOINTMENT (OUTPATIENT)
Age: 57
End: 2022-03-03

## 2022-03-07 ENCOUNTER — APPOINTMENT (OUTPATIENT)
Dept: OTOLARYNGOLOGY | Facility: CLINIC | Age: 57
End: 2022-03-07
Payer: COMMERCIAL

## 2022-03-07 VITALS
WEIGHT: 117 LBS | BODY MASS INDEX: 20.99 KG/M2 | SYSTOLIC BLOOD PRESSURE: 117 MMHG | DIASTOLIC BLOOD PRESSURE: 79 MMHG | HEIGHT: 62.5 IN | HEART RATE: 105 BPM | TEMPERATURE: 98 F

## 2022-03-07 DIAGNOSIS — K11.20 SIALOADENITIS, UNSPECIFIED: ICD-10-CM

## 2022-03-07 PROBLEM — U07.1 COVID-19: Chronic | Status: ACTIVE | Noted: 2022-02-24

## 2022-03-07 PROCEDURE — 99024 POSTOP FOLLOW-UP VISIT: CPT

## 2022-03-07 NOTE — END OF VISIT
[FreeTextEntry3] : I personally saw and examined KEATON MACRESTER in detail. I spoke to HALLIE Adams regarding the assessment and plan of care.  I preformed the procedures and I reviewed the above assessment and plan of care, and agree. I have made changes in changes in the body of the note where appropriate.\par \par

## 2022-03-07 NOTE — HISTORY OF PRESENT ILLNESS
[de-identified] : pt following up s/p R SMG sialoadenscopy 02/02/2022. Pt states with stent in, states with a lot of R SMG swelling with eating or drinking radiating to the right ear. Pt states with a lot of pain, unable to eat due to the pain and swelling. Pt also states has to massage gland down in order to get it down. \par pt stopped taking abx since she was not able to tolerate medication \par no changes in hearing \par no Vertigo, tinnitus, pain, drainage or facial weakness.\par

## 2022-03-07 NOTE — PHYSICAL EXAM
[Midline] : trachea located in midline position [Normal] : no rashes [de-identified] : stent still in, good flow through duct R SMG, stent removed

## 2022-03-07 NOTE — CONSULT LETTER
[Please see my note below.] : Please see my note below. [FreeTextEntry2] : ENT [FreeTextEntry1] : Dear Dr. Cordero \par I had the pleasure of evaluating your patient KEATON GUY, thank you for allowing us to participate in their care. please see full note detailing our visit below.\par If you have any questions, please do not hesitate to call me and I would be happy to discuss further. \par \par Jaziel Garcia M.D.\par Attending Physician,  \par Department of Otolaryngology - Head and Neck Surgery\par [facility] \par Office: [phone]\par Fax: [phone]\par \par

## 2022-03-07 NOTE — ASSESSMENT
[FreeTextEntry1] : 56 y/o F who presents with R SMG sialoadenitis s/p dilation and wash out with some post op swelling, was not tolerating stent so removed today, will see how it goes\par - Will proceed with regiment to increase salivary flow to reduce pooling in the gland and washout any possible obstruction if possible. Recommended hydration, regular massage, sour candies, and warm compress\par will come in if any sx early to dilate

## 2022-03-18 ENCOUNTER — APPOINTMENT (OUTPATIENT)
Dept: OTOLARYNGOLOGY | Facility: CLINIC | Age: 57
End: 2022-03-18

## 2022-03-20 NOTE — ASU PATIENT PROFILE, ADULT - NSICDXPASTSURGICALHX_GEN_ALL_CORE_FT
show PAST SURGICAL HISTORY:  H/O bilateral salpingo-oophorectomy 7/2016    H/O umbilical hernia repair "long time ago" possibly with mesh    History of endometrial ablation 7/2008    History of laparoscopy x 7 for endometriosis 3/93,6/93,12/97,5/00,3/02,3/04, 8/11    History of lithotripsy 6/2021

## 2023-01-24 NOTE — H&P ADULT - PROBLEM SELECTOR PROBLEM 1
Radiology Nursing:    Open Mon-Fri 7:00am - 6:00pm  [phone]    Care instructions for your biopsy:    MEDICATIONS: You have been given medications for conscious sedation. You may feel lightheaded/dizzy throughout the day, so take it easy and rest. For today, have someone drive you home and watch over you. For today, do not drink alcohol, drive, or sign legal documents.    ACTIVITY: Take it easy today and rest. For the next 5 days, avoid strenuous exercise or lifting objects > 10 lbs. Resume normal activity after 5 days.    SHOWERING: You can shower tomorrow.    DRESSING CARE: The puncture site has a band aid on it. You can remove it after 24 hours.    DIET: You can resume eating and drinking fluids. You can also resume taking your usual medications. If you have soreness or pain after the biopsy, you can take Tylenol.    INFECTION: Note any signs and symptoms of infection: redness, swelling at site, pain not relieved by medication, fever > 101F, pus/drainage coming out of incisions. If you experience any of this, let your doctor know. If you are having chest pain or shortness of breath, call 911 and go to the nearest hospital.    BIOPSY RESULTS: Results will be available to your doctor in about 3-5 business days. Please refer to your doctor who ordered the biopsy for discussion of the results.     Ureteral stone

## 2023-03-30 NOTE — ED ADULT NURSE NOTE - DISCHARGE DATE/TIME
16-Jan-2018 11:44 Excisional Biopsy Additional Text (Leave Blank If You Do Not Want): The margin was drawn around the clinically apparent lesion. An elliptical shape was then drawn on the skin incorporating the lesion and margins.  Incisions were then made along these lines to the appropriate tissue plane and the lesion was extirpated.

## 2023-06-15 NOTE — ED PROVIDER NOTE - MUSCULOSKELETAL, MLM
I called Felicia Holder in response to a referral that was received for patient to establish care with Hematology  Outreach was made to schedule a consultation       Someone answered phone with the voicemail and hung up  Another attempt will be made to contact patient  Spine appears normal, range of motion is not limited, no muscle or joint tenderness Spine appears normal, range of motion is not limited, no muscle or joint tenderness.

## 2023-11-09 NOTE — PRE-ANESTHESIA EVALUATION ADULT - NSATTENDATTESTRD_GEN_ALL_CORE
Type of service:    Telemedicine Office Visit for fetal ultrasound    Date of service:     Date: 11/09/23     Time service began:    1:00 PM  Time service ended:    2:00 PM    Reason:      Lack of available service in patient area    Description of basis or telemedicine appropriateness:     Consultation provided at the request of Dr. Remy Toney for advice regarding the diagnosis and treatment of this patient's pregnancy.  The patient's condition can be safely assessed via telemedicine.    The Mode of Transmission:    Secure interactive audio and visual telecommunication system (Video Guidance)    Location of originating and distant sites:      Originating site:   [place], [place]    Distant site:    [place], [place]    For a detailed report of the ultrasound examination, please see the ultrasound report which can be found under the imaging tab.      Lili Santiago MD    
The patient has been re-examined and I agree with the above assessment or I updated with my findings.

## 2024-01-05 ENCOUNTER — APPOINTMENT (OUTPATIENT)
Dept: OBGYN | Facility: CLINIC | Age: 59
End: 2024-01-05
Payer: COMMERCIAL

## 2024-01-05 PROCEDURE — 99396 PREV VISIT EST AGE 40-64: CPT

## 2024-05-21 ENCOUNTER — EMERGENCY (EMERGENCY)
Facility: HOSPITAL | Age: 59
LOS: 1 days | Discharge: HOME | End: 2024-05-21
Attending: EMERGENCY MEDICINE
Payer: COMMERCIAL

## 2024-05-21 VITALS
RESPIRATION RATE: 18 BRPM | SYSTOLIC BLOOD PRESSURE: 115 MMHG | DIASTOLIC BLOOD PRESSURE: 72 MMHG | HEART RATE: 85 BPM | OXYGEN SATURATION: 97 % | TEMPERATURE: 98 F

## 2024-05-21 VITALS
HEIGHT: 63 IN | SYSTOLIC BLOOD PRESSURE: 91 MMHG | DIASTOLIC BLOOD PRESSURE: 64 MMHG | WEIGHT: 115.96 LBS | HEART RATE: 98 BPM | OXYGEN SATURATION: 96 % | TEMPERATURE: 98 F | RESPIRATION RATE: 20 BRPM

## 2024-05-21 DIAGNOSIS — Z90.722 ACQUIRED ABSENCE OF OVARIES, BILATERAL: Chronic | ICD-10-CM

## 2024-05-21 DIAGNOSIS — Z98.89 OTHER SPECIFIED POSTPROCEDURAL STATES: Chronic | ICD-10-CM

## 2024-05-21 DIAGNOSIS — Z98.890 OTHER SPECIFIED POSTPROCEDURAL STATES: Chronic | ICD-10-CM

## 2024-05-21 LAB
ALBUMIN SERPL ELPH-MCNC: 3.9 G/DL — SIGNIFICANT CHANGE UP (ref 3.3–5)
ALP SERPL-CCNC: 89 U/L — SIGNIFICANT CHANGE UP (ref 40–120)
ALT FLD-CCNC: 12 U/L — SIGNIFICANT CHANGE UP (ref 10–45)
ANION GAP SERPL CALC-SCNC: 18 MMOL/L — HIGH (ref 5–17)
AST SERPL-CCNC: 19 U/L — SIGNIFICANT CHANGE UP (ref 10–40)
BASOPHILS # BLD AUTO: 0.01 K/UL — SIGNIFICANT CHANGE UP (ref 0–0.2)
BASOPHILS NFR BLD AUTO: 0.2 % — SIGNIFICANT CHANGE UP (ref 0–2)
BILIRUB SERPL-MCNC: 0.3 MG/DL — SIGNIFICANT CHANGE UP (ref 0.2–1.2)
BUN SERPL-MCNC: 13 MG/DL — SIGNIFICANT CHANGE UP (ref 7–23)
CALCIUM SERPL-MCNC: 9 MG/DL — SIGNIFICANT CHANGE UP (ref 8.4–10.5)
CHLORIDE SERPL-SCNC: 96 MMOL/L — SIGNIFICANT CHANGE UP (ref 96–108)
CO2 SERPL-SCNC: 21 MMOL/L — LOW (ref 22–31)
CREAT SERPL-MCNC: 0.63 MG/DL — SIGNIFICANT CHANGE UP (ref 0.5–1.3)
EGFR: 102 ML/MIN/1.73M2 — SIGNIFICANT CHANGE UP
EOSINOPHIL # BLD AUTO: 0.02 K/UL — SIGNIFICANT CHANGE UP (ref 0–0.5)
EOSINOPHIL NFR BLD AUTO: 0.5 % — SIGNIFICANT CHANGE UP (ref 0–6)
FLUAV AG NPH QL: SIGNIFICANT CHANGE UP
FLUBV AG NPH QL: SIGNIFICANT CHANGE UP
GLUCOSE SERPL-MCNC: 96 MG/DL — SIGNIFICANT CHANGE UP (ref 70–99)
HCT VFR BLD CALC: 37.4 % — SIGNIFICANT CHANGE UP (ref 34.5–45)
HGB BLD-MCNC: 12.3 G/DL — SIGNIFICANT CHANGE UP (ref 11.5–15.5)
IMM GRANULOCYTES NFR BLD AUTO: 0.2 % — SIGNIFICANT CHANGE UP (ref 0–0.9)
LIDOCAIN IGE QN: 24 U/L — SIGNIFICANT CHANGE UP (ref 7–60)
LYMPHOCYTES # BLD AUTO: 1.11 K/UL — SIGNIFICANT CHANGE UP (ref 1–3.3)
LYMPHOCYTES # BLD AUTO: 27.4 % — SIGNIFICANT CHANGE UP (ref 13–44)
MAGNESIUM SERPL-MCNC: 2 MG/DL — SIGNIFICANT CHANGE UP (ref 1.6–2.6)
MCHC RBC-ENTMCNC: 30.9 PG — SIGNIFICANT CHANGE UP (ref 27–34)
MCHC RBC-ENTMCNC: 32.9 GM/DL — SIGNIFICANT CHANGE UP (ref 32–36)
MCV RBC AUTO: 94 FL — SIGNIFICANT CHANGE UP (ref 80–100)
MONOCYTES # BLD AUTO: 0.5 K/UL — SIGNIFICANT CHANGE UP (ref 0–0.9)
MONOCYTES NFR BLD AUTO: 12.3 % — SIGNIFICANT CHANGE UP (ref 2–14)
NEUTROPHILS # BLD AUTO: 2.4 K/UL — SIGNIFICANT CHANGE UP (ref 1.8–7.4)
NEUTROPHILS NFR BLD AUTO: 59.4 % — SIGNIFICANT CHANGE UP (ref 43–77)
NRBC # BLD: 0 /100 WBCS — SIGNIFICANT CHANGE UP (ref 0–0)
PHOSPHATE SERPL-MCNC: 3.5 MG/DL — SIGNIFICANT CHANGE UP (ref 2.5–4.5)
PLATELET # BLD AUTO: 267 K/UL — SIGNIFICANT CHANGE UP (ref 150–400)
POTASSIUM SERPL-MCNC: 3.8 MMOL/L — SIGNIFICANT CHANGE UP (ref 3.5–5.3)
POTASSIUM SERPL-SCNC: 3.8 MMOL/L — SIGNIFICANT CHANGE UP (ref 3.5–5.3)
PROT SERPL-MCNC: 6.9 G/DL — SIGNIFICANT CHANGE UP (ref 6–8.3)
RBC # BLD: 3.98 M/UL — SIGNIFICANT CHANGE UP (ref 3.8–5.2)
RBC # FLD: 12.4 % — SIGNIFICANT CHANGE UP (ref 10.3–14.5)
RSV RNA NPH QL NAA+NON-PROBE: SIGNIFICANT CHANGE UP
SARS-COV-2 RNA SPEC QL NAA+PROBE: SIGNIFICANT CHANGE UP
SODIUM SERPL-SCNC: 135 MMOL/L — SIGNIFICANT CHANGE UP (ref 135–145)
WBC # BLD: 4.05 K/UL — SIGNIFICANT CHANGE UP (ref 3.8–10.5)
WBC # FLD AUTO: 4.05 K/UL — SIGNIFICANT CHANGE UP (ref 3.8–10.5)

## 2024-05-21 PROCEDURE — 85018 HEMOGLOBIN: CPT

## 2024-05-21 PROCEDURE — 82947 ASSAY GLUCOSE BLOOD QUANT: CPT

## 2024-05-21 PROCEDURE — 84132 ASSAY OF SERUM POTASSIUM: CPT

## 2024-05-21 PROCEDURE — 82962 GLUCOSE BLOOD TEST: CPT

## 2024-05-21 PROCEDURE — 99285 EMERGENCY DEPT VISIT HI MDM: CPT

## 2024-05-21 PROCEDURE — 83690 ASSAY OF LIPASE: CPT

## 2024-05-21 PROCEDURE — 71046 X-RAY EXAM CHEST 2 VIEWS: CPT | Mod: 26

## 2024-05-21 PROCEDURE — 96375 TX/PRO/DX INJ NEW DRUG ADDON: CPT

## 2024-05-21 PROCEDURE — 85014 HEMATOCRIT: CPT

## 2024-05-21 PROCEDURE — 96374 THER/PROPH/DIAG INJ IV PUSH: CPT

## 2024-05-21 PROCEDURE — 84295 ASSAY OF SERUM SODIUM: CPT

## 2024-05-21 PROCEDURE — 82330 ASSAY OF CALCIUM: CPT

## 2024-05-21 PROCEDURE — 82803 BLOOD GASES ANY COMBINATION: CPT

## 2024-05-21 PROCEDURE — 87637 SARSCOV2&INF A&B&RSV AMP PRB: CPT

## 2024-05-21 PROCEDURE — 83735 ASSAY OF MAGNESIUM: CPT

## 2024-05-21 PROCEDURE — 84100 ASSAY OF PHOSPHORUS: CPT

## 2024-05-21 PROCEDURE — 80053 COMPREHEN METABOLIC PANEL: CPT

## 2024-05-21 PROCEDURE — 82435 ASSAY OF BLOOD CHLORIDE: CPT

## 2024-05-21 PROCEDURE — 71046 X-RAY EXAM CHEST 2 VIEWS: CPT

## 2024-05-21 PROCEDURE — 99285 EMERGENCY DEPT VISIT HI MDM: CPT | Mod: 25

## 2024-05-21 PROCEDURE — 93005 ELECTROCARDIOGRAM TRACING: CPT

## 2024-05-21 PROCEDURE — 85025 COMPLETE CBC W/AUTO DIFF WBC: CPT

## 2024-05-21 PROCEDURE — 83605 ASSAY OF LACTIC ACID: CPT

## 2024-05-21 RX ORDER — SODIUM CHLORIDE 9 MG/ML
1000 INJECTION INTRAMUSCULAR; INTRAVENOUS; SUBCUTANEOUS ONCE
Refills: 0 | Status: COMPLETED | OUTPATIENT
Start: 2024-05-21 | End: 2024-05-21

## 2024-05-21 RX ORDER — ONDANSETRON 8 MG/1
4 TABLET, FILM COATED ORAL ONCE
Refills: 0 | Status: COMPLETED | OUTPATIENT
Start: 2024-05-21 | End: 2024-05-21

## 2024-05-21 RX ORDER — FAMOTIDINE 10 MG/ML
20 INJECTION INTRAVENOUS ONCE
Refills: 0 | Status: COMPLETED | OUTPATIENT
Start: 2024-05-21 | End: 2024-05-21

## 2024-05-21 RX ORDER — METOCLOPRAMIDE HCL 10 MG
10 TABLET ORAL ONCE
Refills: 0 | Status: COMPLETED | OUTPATIENT
Start: 2024-05-21 | End: 2024-05-21

## 2024-05-21 RX ORDER — ONDANSETRON 8 MG/1
1 TABLET, FILM COATED ORAL
Qty: 1 | Refills: 0
Start: 2024-05-21 | End: 2024-05-23

## 2024-05-21 RX ADMIN — SODIUM CHLORIDE 1000 MILLILITER(S): 9 INJECTION INTRAMUSCULAR; INTRAVENOUS; SUBCUTANEOUS at 20:35

## 2024-05-21 RX ADMIN — ONDANSETRON 4 MILLIGRAM(S): 8 TABLET, FILM COATED ORAL at 18:58

## 2024-05-21 RX ADMIN — FAMOTIDINE 20 MILLIGRAM(S): 10 INJECTION INTRAVENOUS at 18:58

## 2024-05-21 RX ADMIN — SODIUM CHLORIDE 1000 MILLILITER(S): 9 INJECTION INTRAMUSCULAR; INTRAVENOUS; SUBCUTANEOUS at 18:58

## 2024-05-21 RX ADMIN — Medication 10 MILLIGRAM(S): at 20:12

## 2024-05-21 NOTE — ED PROVIDER NOTE - PATIENT PORTAL LINK FT
You can access the FollowMyHealth Patient Portal offered by [facility] by registering at the following website: [email or web address]. By joining [facility]’s FollowMyHealth portal, you will also be able to view your health information using other applications (apps) compatible with our system.

## 2024-05-21 NOTE — ED PROVIDER NOTE - ATTENDING CONTRIBUTION TO CARE
57 yo F w/ PMHx of endometritis, b/l salpingo-oophorectomy, and nephrolithiasis s/p lithotripsy presents for 1 week of cough/congestion and 4 days of intractable vomiting For the past few days mild epigastric discomfort negative Baires sign appears mildly ill no fevers or chills just getting over your URI and supportive care check labs reassess

## 2024-05-21 NOTE — ED ADULT NURSE NOTE - NSFALLUNIVINTERV_ED_ALL_ED
Bed/Stretcher in lowest position, wheels locked, appropriate side rails in place/Call bell, personal items and telephone in reach/Instruct patient to call for assistance before getting out of bed/chair/stretcher/Non-slip footwear applied when patient is off stretcher/[place] to call system/Physically safe environment - no spills, clutter or unnecessary equipment/Purposeful proactive rounding/Room/bathroom lighting operational, light cord in reach

## 2024-05-21 NOTE — ED ADULT NURSE NOTE - OBJECTIVE STATEMENT
Pt is a 59 y/o female with PMH migraines presenting to the ED c/o dry cough Pt is a 57 y/o female with PMH migraines presenting to the ED c/o dry cough, swollen/tender neck glands since last week with n/v, mild epigastric discomfort, chills and nasal congestion x3 days. Pt denies fever, Sob, chest pain, diarrhea, urinary symptoms, dizziness, headache. Pt reports inability to tolerate po intake due to n/v- states emesis is nonbloody.

## 2024-05-21 NOTE — ED PROVIDER NOTE - OBJECTIVE STATEMENT
57 yo F w/ PMHx of endometritis, b/l salpingo-oophorectomy, and nephrolithiasis s/p lithotripsy presents for 1 week of cough/congestion and 4 days of intractable vomiting (nonbloody/nonbilious)/poor PO intake.  She endorses constipation secondary to poor p.o. intake, denies any diarrhea.  She also endorses migraine (consistent with baseline)/responsive to sumatriptan, epigastric soreness, and difficulty with deep breathing secondary to coughing.  She denies any fever/chills, CP,  symptoms.

## 2024-05-21 NOTE — ED PROVIDER NOTE - CLINICAL SUMMARY MEDICAL DECISION MAKING FREE TEXT BOX
57 yo F w/ PMHx of endometritis, b/l salpingo-oophorectomy, and nephrolithiasis s/p lithotripsy presents for 1 week of cough/congestion and 4 days of intractable vomiting (nonbloody/nonbilious)/poor PO intake.  Vital signs are unremarkable.  Physical exam is remarkable for mild epigastric TTP.  Concern for gastroenteritis with poor p.o. intake.  Plan for labs, Maalox/Pepcid, IV fluids.

## 2024-05-21 NOTE — ED ADULT NURSE NOTE - CAS EDN DISCHARGE INTERVENTIONS
Per rounding, patient presented with chest pains  ECHO pending at this time  No needs identified through rounding  Plan for patient to discharge home with family when cleared  IV Removed by ED MD [place]/IV discontinued, cath removed intact

## 2024-05-21 NOTE — ED PROVIDER NOTE - NSFOLLOWUPINSTRUCTIONS_ED_ALL_ED_FT
1. You presented to the emergency department for: vomiting.     2. Your evaluation in the emergency department included a physician evaluation. Your work-up did not reveal any findings indicating the need for admission to the hospital or any emergent interventions at this time.     3. It is recommended that you follow-up with your PCP as arranged by the discharge center for a repeat evaluation, and potentially further testing and treatment.     If needed, to arrange an appointment with a primary care provider please call: [phone]    4. Please continue taking your regular medications as prescribed.     For pain you may take IBUPROFEN or ACETAMINOPHEN every 6-8 hours - as needed.  This is an over-the-counter medication - please read the instructions for use and warnings on the label. If you have any questions regarding its use, you may refer them to your local pharmacist.    5. PLEASE RETURN TO THE EMERGENCY DEPARTMENT IMMEDIATELY IF you develop any fevers not responding to over the counter medications, uncontrollable nausea and vomiting, an inability to tolerate eating and drinking, difficulty breathing, chest pain, a severe increase in your symptoms or pain, or any other new symptoms that concern you.

## 2024-05-24 ENCOUNTER — EMERGENCY (EMERGENCY)
Facility: HOSPITAL | Age: 59
LOS: 1 days | Discharge: HOME | End: 2024-05-24
Attending: EMERGENCY MEDICINE
Payer: COMMERCIAL

## 2024-05-24 VITALS
HEART RATE: 85 BPM | RESPIRATION RATE: 18 BRPM | SYSTOLIC BLOOD PRESSURE: 150 MMHG | DIASTOLIC BLOOD PRESSURE: 90 MMHG | HEIGHT: 63 IN | OXYGEN SATURATION: 98 % | TEMPERATURE: 98 F

## 2024-05-24 VITALS
OXYGEN SATURATION: 98 % | HEART RATE: 65 BPM | SYSTOLIC BLOOD PRESSURE: 99 MMHG | DIASTOLIC BLOOD PRESSURE: 64 MMHG | RESPIRATION RATE: 18 BRPM | TEMPERATURE: 98 F

## 2024-05-24 DIAGNOSIS — Z90.722 ACQUIRED ABSENCE OF OVARIES, BILATERAL: Chronic | ICD-10-CM

## 2024-05-24 DIAGNOSIS — Z98.89 OTHER SPECIFIED POSTPROCEDURAL STATES: Chronic | ICD-10-CM

## 2024-05-24 DIAGNOSIS — Z98.890 OTHER SPECIFIED POSTPROCEDURAL STATES: Chronic | ICD-10-CM

## 2024-05-24 LAB
ACANTHOCYTES BLD QL SMEAR: SLIGHT — SIGNIFICANT CHANGE UP
ALBUMIN SERPL ELPH-MCNC: 3.5 G/DL — SIGNIFICANT CHANGE UP (ref 3.3–5)
ALP SERPL-CCNC: 74 U/L — SIGNIFICANT CHANGE UP (ref 40–120)
ALT FLD-CCNC: 9 U/L — LOW (ref 10–45)
ANION GAP SERPL CALC-SCNC: 14 MMOL/L — SIGNIFICANT CHANGE UP (ref 5–17)
APPEARANCE UR: CLEAR — SIGNIFICANT CHANGE UP
AST SERPL-CCNC: 16 U/L — SIGNIFICANT CHANGE UP (ref 10–40)
B-OH-BUTYR SERPL-SCNC: 1.7 MMOL/L — HIGH
BACTERIA # UR AUTO: ABNORMAL /HPF
BASOPHILS # BLD AUTO: 0 K/UL — SIGNIFICANT CHANGE UP (ref 0–0.2)
BASOPHILS NFR BLD AUTO: 0 % — SIGNIFICANT CHANGE UP (ref 0–2)
BILIRUB SERPL-MCNC: 0.4 MG/DL — SIGNIFICANT CHANGE UP (ref 0.2–1.2)
BILIRUB UR-MCNC: NEGATIVE — SIGNIFICANT CHANGE UP
BUN SERPL-MCNC: <4 MG/DL — LOW (ref 7–23)
BURR CELLS BLD QL SMEAR: PRESENT — SIGNIFICANT CHANGE UP
CALCIUM SERPL-MCNC: 8.3 MG/DL — LOW (ref 8.4–10.5)
CAST: 0 /LPF — SIGNIFICANT CHANGE UP (ref 0–4)
CHLORIDE SERPL-SCNC: 101 MMOL/L — SIGNIFICANT CHANGE UP (ref 96–108)
CO2 SERPL-SCNC: 23 MMOL/L — SIGNIFICANT CHANGE UP (ref 22–31)
COLOR SPEC: YELLOW — SIGNIFICANT CHANGE UP
CREAT SERPL-MCNC: 0.59 MG/DL — SIGNIFICANT CHANGE UP (ref 0.5–1.3)
DIFF PNL FLD: NEGATIVE — SIGNIFICANT CHANGE UP
EGFR: 104 ML/MIN/1.73M2 — SIGNIFICANT CHANGE UP
ELLIPTOCYTES BLD QL SMEAR: SIGNIFICANT CHANGE UP
EOSINOPHIL # BLD AUTO: 0 K/UL — SIGNIFICANT CHANGE UP (ref 0–0.5)
EOSINOPHIL NFR BLD AUTO: 0 % — SIGNIFICANT CHANGE UP (ref 0–6)
GIANT PLATELETS BLD QL SMEAR: PRESENT — SIGNIFICANT CHANGE UP
GLUCOSE SERPL-MCNC: 110 MG/DL — HIGH (ref 70–99)
GLUCOSE UR QL: NEGATIVE MG/DL — SIGNIFICANT CHANGE UP
HCG SERPL-ACNC: <2 MIU/ML — SIGNIFICANT CHANGE UP
HCT VFR BLD CALC: 33 % — LOW (ref 34.5–45)
HGB BLD-MCNC: 11.2 G/DL — LOW (ref 11.5–15.5)
KETONES UR-MCNC: 15 MG/DL
LEUKOCYTE ESTERASE UR-ACNC: NEGATIVE — SIGNIFICANT CHANGE UP
LIDOCAIN IGE QN: 28 U/L — SIGNIFICANT CHANGE UP (ref 7–60)
LYMPHOCYTES # BLD AUTO: 1.61 K/UL — SIGNIFICANT CHANGE UP (ref 1–3.3)
LYMPHOCYTES # BLD AUTO: 38.7 % — SIGNIFICANT CHANGE UP (ref 13–44)
MAGNESIUM SERPL-MCNC: 1.7 MG/DL — SIGNIFICANT CHANGE UP (ref 1.6–2.6)
MANUAL SMEAR VERIFICATION: SIGNIFICANT CHANGE UP
MCHC RBC-ENTMCNC: 30.9 PG — SIGNIFICANT CHANGE UP (ref 27–34)
MCHC RBC-ENTMCNC: 33.9 GM/DL — SIGNIFICANT CHANGE UP (ref 32–36)
MCV RBC AUTO: 91.2 FL — SIGNIFICANT CHANGE UP (ref 80–100)
MONOCYTES # BLD AUTO: 0.3 K/UL — SIGNIFICANT CHANGE UP (ref 0–0.9)
MONOCYTES NFR BLD AUTO: 7.2 % — SIGNIFICANT CHANGE UP (ref 2–14)
NEUTROPHILS # BLD AUTO: 2.22 K/UL — SIGNIFICANT CHANGE UP (ref 1.8–7.4)
NEUTROPHILS NFR BLD AUTO: 53.2 % — SIGNIFICANT CHANGE UP (ref 43–77)
NITRITE UR-MCNC: NEGATIVE — SIGNIFICANT CHANGE UP
PH UR: 7.5 — SIGNIFICANT CHANGE UP (ref 5–8)
PLAT MORPH BLD: ABNORMAL
PLATELET # BLD AUTO: 284 K/UL — SIGNIFICANT CHANGE UP (ref 150–400)
POIKILOCYTOSIS BLD QL AUTO: SIGNIFICANT CHANGE UP
POTASSIUM SERPL-MCNC: 3.1 MMOL/L — LOW (ref 3.5–5.3)
POTASSIUM SERPL-SCNC: 3.1 MMOL/L — LOW (ref 3.5–5.3)
PROT SERPL-MCNC: 6 G/DL — SIGNIFICANT CHANGE UP (ref 6–8.3)
PROT UR-MCNC: NEGATIVE MG/DL — SIGNIFICANT CHANGE UP
RBC # BLD: 3.62 M/UL — LOW (ref 3.8–5.2)
RBC # FLD: 12.1 % — SIGNIFICANT CHANGE UP (ref 10.3–14.5)
RBC BLD AUTO: ABNORMAL
RBC CASTS # UR COMP ASSIST: 1 /HPF — SIGNIFICANT CHANGE UP (ref 0–4)
SCHISTOCYTES BLD QL AUTO: SLIGHT — SIGNIFICANT CHANGE UP
SODIUM SERPL-SCNC: 138 MMOL/L — SIGNIFICANT CHANGE UP (ref 135–145)
SP GR SPEC: 1.02 — SIGNIFICANT CHANGE UP (ref 1–1.03)
SQUAMOUS # UR AUTO: 4 /HPF — SIGNIFICANT CHANGE UP (ref 0–5)
UROBILINOGEN FLD QL: 0.2 MG/DL — SIGNIFICANT CHANGE UP (ref 0.2–1)
VARIANT LYMPHS # BLD: 0.9 % — SIGNIFICANT CHANGE UP (ref 0–6)
WBC # BLD: 4.17 K/UL — SIGNIFICANT CHANGE UP (ref 3.8–10.5)
WBC # FLD AUTO: 4.17 K/UL — SIGNIFICANT CHANGE UP (ref 3.8–10.5)
WBC UR QL: 2 /HPF — SIGNIFICANT CHANGE UP (ref 0–5)

## 2024-05-24 PROCEDURE — 82010 KETONE BODYS QUAN: CPT

## 2024-05-24 PROCEDURE — 84702 CHORIONIC GONADOTROPIN TEST: CPT

## 2024-05-24 PROCEDURE — 99285 EMERGENCY DEPT VISIT HI MDM: CPT | Mod: 25

## 2024-05-24 PROCEDURE — 83690 ASSAY OF LIPASE: CPT

## 2024-05-24 PROCEDURE — 76705 ECHO EXAM OF ABDOMEN: CPT | Mod: 26

## 2024-05-24 PROCEDURE — 74177 CT ABD & PELVIS W/CONTRAST: CPT | Mod: MC

## 2024-05-24 PROCEDURE — 81001 URINALYSIS AUTO W/SCOPE: CPT

## 2024-05-24 PROCEDURE — 99285 EMERGENCY DEPT VISIT HI MDM: CPT

## 2024-05-24 PROCEDURE — 74177 CT ABD & PELVIS W/CONTRAST: CPT | Mod: 26,MC

## 2024-05-24 PROCEDURE — 80053 COMPREHEN METABOLIC PANEL: CPT

## 2024-05-24 PROCEDURE — 87086 URINE CULTURE/COLONY COUNT: CPT

## 2024-05-24 PROCEDURE — 83735 ASSAY OF MAGNESIUM: CPT

## 2024-05-24 PROCEDURE — 93005 ELECTROCARDIOGRAM TRACING: CPT

## 2024-05-24 PROCEDURE — 85025 COMPLETE CBC W/AUTO DIFF WBC: CPT

## 2024-05-24 PROCEDURE — 76705 ECHO EXAM OF ABDOMEN: CPT

## 2024-05-24 PROCEDURE — 87449 NOS EACH ORGANISM AG IA: CPT

## 2024-05-24 RX ORDER — CEFTRIAXONE 500 MG/1
1000 INJECTION, POWDER, FOR SOLUTION INTRAMUSCULAR; INTRAVENOUS ONCE
Refills: 0 | Status: COMPLETED | OUTPATIENT
Start: 2024-05-24 | End: 2024-05-24

## 2024-05-24 RX ORDER — KETOROLAC TROMETHAMINE 30 MG/ML
15 SYRINGE (ML) INJECTION ONCE
Refills: 0 | Status: DISCONTINUED | OUTPATIENT
Start: 2024-05-24 | End: 2024-05-24

## 2024-05-24 RX ORDER — METOCLOPRAMIDE HCL 10 MG
1 TABLET ORAL
Qty: 1 | Refills: 0
Start: 2024-05-24 | End: 2024-05-25

## 2024-05-24 RX ORDER — AZITHROMYCIN 500 MG/1
1 TABLET, FILM COATED ORAL
Qty: 4 | Refills: 0
Start: 2024-05-24 | End: 2024-05-27

## 2024-05-24 RX ORDER — SODIUM CHLORIDE 9 MG/ML
1000 INJECTION, SOLUTION INTRAVENOUS
Refills: 0 | Status: DISCONTINUED | OUTPATIENT
Start: 2024-05-24 | End: 2024-05-27

## 2024-05-24 RX ORDER — CEFDINIR 250 MG/5ML
1 POWDER, FOR SUSPENSION ORAL
Qty: 20 | Refills: 0
Start: 2024-05-24 | End: 2024-06-02

## 2024-05-24 RX ORDER — CEFPODOXIME PROXETIL 100 MG
1 TABLET ORAL
Qty: 20 | Refills: 0
Start: 2024-05-24 | End: 2024-06-02

## 2024-05-24 RX ORDER — DIPHENHYDRAMINE HCL 50 MG
25 CAPSULE ORAL ONCE
Refills: 0 | Status: COMPLETED | OUTPATIENT
Start: 2024-05-24 | End: 2024-05-24

## 2024-05-24 RX ORDER — SODIUM CHLORIDE 9 MG/ML
1000 INJECTION, SOLUTION INTRAVENOUS ONCE
Refills: 0 | Status: COMPLETED | OUTPATIENT
Start: 2024-05-24 | End: 2024-05-24

## 2024-05-24 RX ORDER — THIAMINE MONONITRATE (VIT B1) 100 MG
100 TABLET ORAL ONCE
Refills: 0 | Status: COMPLETED | OUTPATIENT
Start: 2024-05-24 | End: 2024-05-24

## 2024-05-24 RX ORDER — METOCLOPRAMIDE HCL 10 MG
10 TABLET ORAL ONCE
Refills: 0 | Status: COMPLETED | OUTPATIENT
Start: 2024-05-24 | End: 2024-05-24

## 2024-05-24 RX ORDER — FAMOTIDINE 10 MG/ML
20 INJECTION INTRAVENOUS ONCE
Refills: 0 | Status: COMPLETED | OUTPATIENT
Start: 2024-05-24 | End: 2024-05-24

## 2024-05-24 RX ORDER — ACETAMINOPHEN 500 MG
1000 TABLET ORAL ONCE
Refills: 0 | Status: COMPLETED | OUTPATIENT
Start: 2024-05-24 | End: 2024-05-24

## 2024-05-24 RX ORDER — POTASSIUM CHLORIDE 20 MEQ
10 PACKET (EA) ORAL
Refills: 0 | Status: COMPLETED | OUTPATIENT
Start: 2024-05-24 | End: 2024-05-24

## 2024-05-24 RX ORDER — AZITHROMYCIN 500 MG/1
500 TABLET, FILM COATED ORAL ONCE
Refills: 0 | Status: COMPLETED | OUTPATIENT
Start: 2024-05-24 | End: 2024-05-24

## 2024-05-24 RX ORDER — SODIUM CHLORIDE 9 MG/ML
1000 INJECTION INTRAMUSCULAR; INTRAVENOUS; SUBCUTANEOUS ONCE
Refills: 0 | Status: DISCONTINUED | OUTPATIENT
Start: 2024-05-24 | End: 2024-05-24

## 2024-05-24 RX ADMIN — FAMOTIDINE 20 MILLIGRAM(S): 10 INJECTION INTRAVENOUS at 14:06

## 2024-05-24 RX ADMIN — SODIUM CHLORIDE 1000 MILLILITER(S): 9 INJECTION, SOLUTION INTRAVENOUS at 08:20

## 2024-05-24 RX ADMIN — Medication 100 MILLIGRAM(S): at 10:27

## 2024-05-24 RX ADMIN — Medication 1000 MILLIGRAM(S): at 08:50

## 2024-05-24 RX ADMIN — Medication 1000 MILLIGRAM(S): at 08:35

## 2024-05-24 RX ADMIN — Medication 1000 MILLIGRAM(S): at 14:36

## 2024-05-24 RX ADMIN — Medication 400 MILLIGRAM(S): at 14:06

## 2024-05-24 RX ADMIN — AZITHROMYCIN 255 MILLIGRAM(S): 500 TABLET, FILM COATED ORAL at 14:57

## 2024-05-24 RX ADMIN — Medication 100 MILLIEQUIVALENT(S): at 13:19

## 2024-05-24 RX ADMIN — CEFTRIAXONE 100 MILLIGRAM(S): 500 INJECTION, POWDER, FOR SOLUTION INTRAMUSCULAR; INTRAVENOUS at 14:07

## 2024-05-24 RX ADMIN — Medication 100 MILLIEQUIVALENT(S): at 11:08

## 2024-05-24 RX ADMIN — SODIUM CHLORIDE 100 MILLILITER(S): 9 INJECTION, SOLUTION INTRAVENOUS at 10:20

## 2024-05-24 RX ADMIN — CEFTRIAXONE 1000 MILLIGRAM(S): 500 INJECTION, POWDER, FOR SOLUTION INTRAMUSCULAR; INTRAVENOUS at 14:37

## 2024-05-24 RX ADMIN — SODIUM CHLORIDE 1000 MILLILITER(S): 9 INJECTION, SOLUTION INTRAVENOUS at 09:20

## 2024-05-24 RX ADMIN — Medication 400 MILLIGRAM(S): at 08:20

## 2024-05-24 RX ADMIN — Medication 100 MILLIEQUIVALENT(S): at 10:10

## 2024-05-24 RX ADMIN — Medication 15 MILLIGRAM(S): at 16:56

## 2024-05-24 RX ADMIN — AZITHROMYCIN 500 MILLIGRAM(S): 500 TABLET, FILM COATED ORAL at 15:57

## 2024-05-24 RX ADMIN — Medication 10 MILLIGRAM(S): at 08:20

## 2024-05-24 RX ADMIN — Medication 10 MILLIEQUIVALENT(S): at 14:19

## 2024-05-24 RX ADMIN — Medication 15 MILLIGRAM(S): at 16:26

## 2024-05-24 RX ADMIN — Medication 10 MILLIEQUIVALENT(S): at 11:10

## 2024-05-24 RX ADMIN — Medication 1000 MILLIGRAM(S): at 14:21

## 2024-05-24 RX ADMIN — Medication 25 MILLIGRAM(S): at 10:27

## 2024-05-24 NOTE — ED PROVIDER NOTE - OBJECTIVE STATEMENT
58-year-old female with past medical history of kidney stones status post lithotripsy, and past surgical history of bilateral salpingo-oophorectomy, presenting with nausea, vomiting, abdominal pain for 1 week.  Patient reports that about a week ago she developed cough, congestion.  Since then she has been feeling ill with inability to take p.o.  Patient was recently in ED and given Zofran and discharged.  Patient also had a nurse visit to home and give her IV Reglan.  However was unable to tolerate p.o. after this.  Patient reports feeling anxious.  Expresses desire to eat but is physically unable to keep food down.  She reports that her mouth feels very dry.  Patient has been having bowel movements.  Reports that bowel movements are normal and nonbloody.  Denies any chest pain, shortness of breath, dysuria, hematuria, bloody stool, diarrhea, hematemesis, rashes.  No recent travel or sick contacts.     Denies SI/HI. Denies history of frequent drinking or previous alcohol withdrawal.  Denies any tobacco or recreational drug use.

## 2024-05-24 NOTE — ED PROVIDER NOTE - NSFOLLOWUPINSTRUCTIONS_ED_ALL_ED_FT
Your CAT scan shows that you have a pneumonia, which is likely causing all these gastric symptoms as well.     Please follow-up with your primary care doctor in 1 to 2 days.    Please take your antibiotics as prescribed.  You may start your next doses tomorrow 5/25.  I sent you 2 antibiotics to your pharmacy.  Please take them until they are fully completed.    I also sent you a nausea med called Reglan.  Please take as prescribed.  Allow for 30 minutes before the antinausea effect sets in.  If you feel a little jittery after taking this medication please take a dose of Benadryl 25 mg, over-the-counter.  Benadryl also helps with nausea.    Take Tylenol over-the-counter and famotidine (Pepcid) over-the-counter for abdominal pain.    Please stay hydrated by increasing her fluid intake and consider also drinking Pedialyte which has balanced electrolytes.  Please eat bland foods including crackers, bread, mashed potatoes, applesauce, soups.  Eat what you can tolerate.  Eat frequent small portions.

## 2024-05-24 NOTE — ED ADULT NURSE NOTE - OBJECTIVE STATEMENT
Pt came in for abdominal pain, nausea and vomiting for one week.  reports that pt has been dieting and lost a lot of weight. Pt is taking wellbutrin for her depression as per . Pt is alert and awake orientedX3. No distress. Breathing easy and non labored.

## 2024-05-24 NOTE — ED PROVIDER NOTE - CLINICAL SUMMARY MEDICAL DECISION MAKING FREE TEXT BOX
see mdm 58-year-old female with past medical history of kidney stones status post lithotripsy, and past surgical history of bilateral salpingo-oophorectomy, presenting with nausea, vomiting, abdominal pain for 1 week.    Vital signs show hypertension otherwise normal vital signs.  Physical exam shows patient is anxious, shaking, tearful.  Has right lower quadrant tenderness to palpation.  Dry mucous membranes.    Differentials include but not limited to viral gastroenteritis, appendicitis, UTI. Consider SBO, Renal stone.  Will assess for pancreatitis.

## 2024-05-24 NOTE — ED PROVIDER NOTE - ATTENDING CONTRIBUTION TO CARE
Attending MD Beckett:  I have seen and examined this patient and fully participated in the care of this patient as the teaching attending. I personally made/approved the management plan and take responsibility for the patient management.    58-year-old woman with pertinent surgical history of bilateral oophorectomy and salpingectomy remotely, kidney stones is presenting for evaluation of 1 week of right lower quadrant abdominal pain nausea and vomiting.  Patient states she developed first symptoms of nausea and vomiting and then 2 days later developed the abdominal pain.  The pain is sharp nonradiating constant no spikes in intensity.  Having regular bowel movements no melena no hematochezia, no hematemesis.  Urinary frequency but she thinks this is because she is drinking so much fluid.  No dysuria or hematuria.  Patient was seen in this emergency department 2 days prior and had lab work and symptomatic control and was released.    Patient on exam with normal vital signs, she is fatigued appearing but nontoxic.  She appears slightly tachypneic.  The abdomen is soft nondistended there is global tenderness to palpation but maximally in the right lower quadrant with some voluntary guarding but no rebound tenderness.  Extremities are warm and well-perfused.  Dry mucous membranes.    Patient presenting for nausea vomiting right lower quadrant abdominal pain, differential diagnosis includes but is not fully limited to appendicitis, colitis, diverticulitis, SBO, ureteral colic.  Plan for CT abdomen pelvis for further investigation, symptomatic care with IV fluids antiemetics analgesia and reassessment.      *The above represents an initial assessment/impression. Please refer to progress notes for potential changes in patient clinical course*

## 2024-05-24 NOTE — ED PROVIDER NOTE - PHYSICAL EXAMINATION
GENERAL: Anxious, shaking, tearful  HEAD:  Atraumatic, Normocephalic  EYES: EOMI, conjunctiva and sclera clear  ENT: Dry mucous membranes  NECK: Supple  CHEST/LUNG: Unlabored respirations. Clear to auscultation bilaterally. No rales, rhonchi, wheezing, or rubs  HEART: Regular rate and rhythm. No murmurs, rubs, or gallops  ABDOMEN: Soft, nondistended, Tender in the right lower quadrant.  EXTREMITIES:  No cyanosis or edema. Brisk capillary refill  NERVOUS SYSTEM:  No focal deficits. A&Ox3  SKIN: No rashes or lesions

## 2024-05-24 NOTE — ED PROVIDER NOTE - PROGRESS NOTE DETAILS
Cyndi Morel, PGY-2: Patient passed p.o. challenge.  Ate a whole turkey sandwich and had an apple juice container. Family at bedside agrees that patient looks better.  Patient reports that she feels significantly better.  No longer appearing anxious, tremulous.

## 2024-05-24 NOTE — ED PROVIDER NOTE - OTHER FINDINGS
ECG recorded at 8:08 AM independently interpreted by me , Dr Shadi Beckett,  at 8:21 AM shows normal sinus rhythm normal axis normal intervals QTc 441 ms no acute diagnostic ischemic ST-T change

## 2024-05-24 NOTE — ED PROVIDER NOTE - PATIENT PORTAL LINK FT
You can access the FollowMyHealth Patient Portal offered by [facility] by registering at the following website: [email or web address]. By joining [facility]’s FollowMyHealth portal, you will also be able to view your health information using other applications (apps) compatible with our system.

## 2024-05-25 LAB — LEGIONELLA AG UR QL: NEGATIVE — SIGNIFICANT CHANGE UP

## 2024-05-25 NOTE — ED POST DISCHARGE NOTE - RESULT SUMMARY
5/25 Patient populated on incidental imaging finding list today. Per chart review appears imaging results addressed except for "mild asymmetric fullness of right renal collecting system and right ureter" recommending continued f/u. 5/25 Patient populated on incidental imaging finding list today. Per chart review appears imaging results addressed except for "mild asymmetric fullness of right renal collecting system and right ureter" recommending continued f/u, and liver hemangioma

## 2024-05-25 NOTE — ED POST DISCHARGE NOTE - DETAILS
Called and spoke with patient, discussed results, confirmed she has a urologist to f/u with and plans to. - Payton Pham PA-C Called and spoke with patient, discussed results, confirmed she has a urologist to f/u with and plans to. Advised PCP f/u for hemangioma. - Payton Pham PA-C

## 2024-05-26 LAB
CULTURE RESULTS: SIGNIFICANT CHANGE UP
SPECIMEN SOURCE: SIGNIFICANT CHANGE UP

## 2024-09-29 NOTE — ED ADULT TRIAGE NOTE - NS ED NURSE BANDS TYPE
Resume services with [facility]  [phone]    Cont HD MWF.   Take pain medicine as needed. Cont tube feeds.   
Name band;

## 2025-01-10 ENCOUNTER — APPOINTMENT (OUTPATIENT)
Dept: OBGYN | Facility: CLINIC | Age: 60
End: 2025-01-10
Payer: COMMERCIAL

## 2025-01-10 PROCEDURE — 99459 PELVIC EXAMINATION: CPT

## 2025-01-10 PROCEDURE — 99396 PREV VISIT EST AGE 40-64: CPT

## 2025-01-10 PROCEDURE — 96127 BRIEF EMOTIONAL/BEHAV ASSMT: CPT

## 2025-03-26 NOTE — PRE-ANESTHESIA EVALUATION ADULT - NSANTHRISKNONERD_GEN_ALL_CORE
Detail Level: Zone
Patient Specific Otc Recommendations (Will Not Stick From Patient To Patient): Hibicleans 2-3 times a day
No risk alerts present

## 2025-07-09 ENCOUNTER — NON-APPOINTMENT (OUTPATIENT)
Age: 60
End: 2025-07-09

## 2025-07-11 ENCOUNTER — APPOINTMENT (OUTPATIENT)
Dept: PULMONOLOGY | Facility: CLINIC | Age: 60
End: 2025-07-11
Payer: COMMERCIAL

## 2025-07-11 VITALS
BODY MASS INDEX: 22.86 KG/M2 | OXYGEN SATURATION: 98 % | HEIGHT: 63 IN | WEIGHT: 129 LBS | HEART RATE: 93 BPM | DIASTOLIC BLOOD PRESSURE: 68 MMHG | SYSTOLIC BLOOD PRESSURE: 102 MMHG

## 2025-07-11 LAB — POCT - HEMOGLOBIN (HGB), QUANTITATIVE, TRANSCUTANEOUS: 11.8

## 2025-07-11 PROCEDURE — 94727 GAS DIL/WSHOT DETER LNG VOL: CPT

## 2025-07-11 PROCEDURE — 88738 HGB QUANT TRANSCUTANEOUS: CPT

## 2025-07-11 PROCEDURE — 94729 DIFFUSING CAPACITY: CPT

## 2025-07-11 PROCEDURE — 99204 OFFICE O/P NEW MOD 45 MIN: CPT | Mod: 25

## 2025-07-11 PROCEDURE — ZZZZZ: CPT

## 2025-07-11 PROCEDURE — 71046 X-RAY EXAM CHEST 2 VIEWS: CPT

## 2025-07-11 PROCEDURE — 94010 BREATHING CAPACITY TEST: CPT

## (undated) DEVICE — VENODYNE/SCD SLEEVE CALF MEDIUM

## (undated) DEVICE — EXTRACTOR SALIVARY STONE NCIRCLE 1.5FR 10MM

## (undated) DEVICE — WARMING BLANKET LOWER ADULT

## (undated) DEVICE — BLADE SCALPEL SAFETYLOCK #11

## (undated) DEVICE — SPECIMEN CONTAINER 100ML

## (undated) DEVICE — DRAPE MAYO STAND 30"

## (undated) DEVICE — TUBING TRUWAVE PRESSURE MALE/FEMALE 72"

## (undated) DEVICE — ELCTR BOVIE TIP NEEDLE INSULATED 2.8" EDGE

## (undated) DEVICE — SOL IRR POUR NS 0.9% 500ML

## (undated) DEVICE — SYR LUER LOK 5CC

## (undated) DEVICE — Device

## (undated) DEVICE — SOL IRR POUR H2O 250ML

## (undated) DEVICE — MEDICATION LABELS W MARKER

## (undated) DEVICE — NDL HYPO REGULAR BEVEL 25G X 1.5" (BLUE)

## (undated) DEVICE — DRAPE INSTRUMENT POUCH 6.75" X 11"

## (undated) DEVICE — DRAPE SPLIT SHEET 77" X 108"

## (undated) DEVICE — TONGUE DEPRESSOR

## (undated) DEVICE — POSITIONER FOAM HEADREST (PINK)

## (undated) DEVICE — DRAPE MAGNETIC INSTRUMENT MEDIUM

## (undated) DEVICE — DRAPE TOWEL BLUE 17" X 24"

## (undated) DEVICE — SALIVARY ACCESS DILATOR

## (undated) DEVICE — SPONGE PEANUT AUTO COUNT

## (undated) DEVICE — POSITIONER FOAM EGG CRATE ULNAR 2PCS (PINK)